# Patient Record
Sex: MALE | Race: WHITE
[De-identification: names, ages, dates, MRNs, and addresses within clinical notes are randomized per-mention and may not be internally consistent; named-entity substitution may affect disease eponyms.]

---

## 2017-06-20 NOTE — DIAGNOSTIC IMAGING REPORT
MRI OF THE LUMBAR SPINE WITHOUT IV CONTRAST



CLINICAL HISTORY: Chronic low back pain. Left lower extremity radiculopathy.



COMPARISON STUDY: Radiographs of the lumbar spine dated 11/30/2011. Abdominal CT

dated 5/27/2015.



TECHNIQUE: MRI of the lumbar spine is performed utilizing various T1 and

T2-weighted sequences in the axial and sagittal planes. IV contrast was not

administered for this examination.



FINDINGS:



Lumbar spine: Vertebral body height and alignment are maintained throughout the

lumbar spine. There is straightening of the lumbar lordosis. Marrow signal

intensity is heterogeneous. Chronic degenerative endplate change is identified

at L4-L5. Small anterior osteophytes are seen from L3 to S1. Small hemangiomas

are seen at several levels. No destructive bony process is seen. The transverse

and spinous processes appear intact. There is no evidence of spondylolysis.



Intervertebral discs: Degenerative disc desiccation is seen throughout the

lumbar spine. There is severe loss of height at L4-L5 and moderate loss of

height at L5-S1.



Spinal cord and central canal: The visualized spinal cord is normal in

morphology and signal intensity. The conus medullaris terminates at the T12-L1

interspace. The nerve roots of the cauda equina are normal in morphology. There

is peripheral splaying of the nerve roots of the cauda equina seen at the level

of L5. The central canal appears slightly widened at this level, and there is an

apparent septation within the central canal seen at the level of S1 where the

nerve roots reconverge centrally. This is noted on axial image #25. There is

near-complete fatty replacement of the central canal in the sacral region.



L1-L2: Unremarkable.



L2-L3: There is minimal posterior disc bulge. In conjunction with hypertrophy of

the ligamentum flavum there is mild acquired compromise of the central canal.

The minimum AP diameter measures 8 mm. There is minimal bilateral subarticular

stenosis. The neural foramina are patent. A synovial cyst on the right projects

medially at this level and measures 6 mm. This is best seen on axial image #8

and impinges on the posterior aspect of the thecal sac.



L3-L4: There is mild posterior disc bulge. In conjunction with hypertrophy of

the ligamentum flavum as well as a 10 mm T1 and T2 hypointense medially

projecting indeterminant nodule identified along the right aspect of the

ligamentum flavum adjacent to the facet joint seen on axial image #13 this

causes mild acquired compromise of the central canal. There is effacement of the

posterior right thecal sac from this nodular density. This impinges on the

transiting right-sided nerve roots. There is mild central canal stenosis at this

level with a minimum AP diameter of 7.5 mm.



L4-L5: There is broad-based posterior disc bulge eccentric to left. The central

canal appears clear. There is severe left-sided subarticular stenosis with

probable impingement on the exiting left L4 nerve root. Facet arthropathy causes

moderate left and minimal right neural foraminal stenosis.



L5-S1: There is mild posterior disc bulge. Prominent facet arthropathy causes

moderate bilateral neural foraminal stenosis. The central canal appears clear at

this level with the nerve roots displayed peripherally.



Sacrum: Visualized sacrum is normal in morphology and signal intensity.



Soft tissues: The paraspinous soft tissues are normal as imaged. The partially

visualized retroperitoneal structures are grossly normal but incompletely

assessed.





IMPRESSION:



1. No destructive bony process is identified.



2. There is a synovial cyst identified on the right at L2-L3. This effaces the

right posterior aspect of the thecal sac, and there is mild acquired compromise

of the central canal at this level.



3. There is an indeterminant nodule identified along the right ligamentum flavum

at L3-L4. This is located adjacent to the facet joint and may represent a

complex synovial cyst (T2 hypointense). Other lesions are not excluded. If

clinically warranted this could be further assessed with a contrast-enhanced

examination. This nodule effaces the right posterior aspect of the thecal sac at

this level and impinges on the transiting nerve roots. There is mild acquired

compromise of the central canal at this level.



4. There is near-complete fatty replacement of the central canal in the sacral

region. There is a septation suggested within the central canal in the AP plane

at S1. This is of indeterminant significance and may be on a congenital basis.



5. There is peripheral splaying of the nerve roots of the cauda equina at L5.

There is no compromise of the central canal at this level, and this is of

indeterminant significance. This may be related to a flow phenomenon, the

septation seen more inferiorly, or possibly represent a simple cystic lesion

within the central canal. The nerve roots of the cauda equina are normal in

morphology.



6. See discussion for detailed level by level analysis.









Dictated:  6/20/2017 10:24 AM

Transcribed:  6/20/2017 10:57 AM

[email or web address]







Electronically signed by:  Thor Granados M.D.

6/20/2017 2:38 PM



Dictated Date/Time:  6/20/2017 10:24 AM

## 2017-07-07 NOTE — PAP/PSG TECHNICIAN REPORT
[facility]

Technician Polysomnogram Report



Study name:

None

Report date:

7/7/2017



Study date:

7/6/2017

Referring Physician:

 José Miguel Quintanilla M.D.



Name:

NANDA BRIAN BEARD

Interpreting Physician:

José Miguel Quintanilla M.D.



YOB: 1950

Technician:

Mann Sanderson RPSGT.



Sex:

Male







Age:

67

StudyType:

PSG



Weight:

198 lbs





17 inches



Height:

67 years, Height 6' 0"

Neck Circum:







BMI:

26.85







Medications:

GLUCOPHAGE 500 MG, GLYBURIDE 2.5 MG, METOPROLOL SUCCINATE ER 25 MG, OPANA ER 10 MG, SIMVASTATIN 20 
MG, TAMSULOSIN HCL 0.4 MG















Patient History





PATIENT HAS HISTORY OF SNORING AND FATIGUE. HE ALSO HAS HISTORY MAINTAING SLEEP AND THIS WILL HAPPEN 
ALMOST EVERY NIGHT. HE IS ON CHRONIC NARCOTICS. HE HAD SLEEP STUDY DONE IN THE PAST BUT ONLY SHOWED 
MILD APNEA. HE IS HERE [facility] FOR AN EVALUATION FOR DIANA. ESS = 13 RM 1







Parameters Monitored

NPSG: E1-M2, E2-M1, Fp1-M2, Fp2-M1, F3-M2, F4-M2, F4-M1, C3-M2, C4-M2, C4-M1, O1-M2, O2-M2,

O2-M1, T3-M2, T4-M1, P3-M2, P4-M1, CHIN1, CHIN2, HR, EKG, Legs, PFLOW, SNOR, FLOW, CFLOW,

Tidal Volume, THOR, ABDO, SpO2, PLTH, CPRESS, ETCO2 Wave, ETCO2, pH





Sleep Architecture



Sleep Stages



Time at Lights Off

10:39:02 PM



STAGES

Time (min.)

TST (%)



Time at Lights On

5:28:32 AM



Wake

41.0

--



Total Recording Time (TRT)

410.00 min.



N1

5.5

1



Total Sleep Period (TSP)

405.5 min.



N2

228.5

62



Total Sleep Time (TST)

368.5min.



N3

52.0

14



Awake Time

41.0 min.



REM

82.5

22



Wake after Sleep Onset

40.0 min.











Sleep Efficiency (SE)

90 %











Sleep Onset Latency (SOL)

1.0 min.











Number of Stage 1 Shifts

None











Awakenings

10











Stage Changes

81











Number of REM periods

5



REM

82.5

22



REM Latency

10.5 min.



NREM

286.0

78





Body Position Analysis





Supine

Right

Left

Side

Prone

Vertical



Total Sleep Time (min.)

167.0

208.3

0.0

208.35

0.0

0.0



Total Sleep Time (%)

43%

57%

0%

57

0%

N/A%



Total Sleep Time REM (min.)

4.9

77.6

0.0

None

0.0

0.0



Total Sleep Time NREM (min.)

155.2

130.8

0.0

None

0.0

0.0



Intermittent Wake (min.)

6.8

34.2

0.0

None

0.0

0.0



Total Sleep Period (%)

40%

None





Arousals







Myoclonus (PLM) *







Events

Count

Index



Events

Count

Index



Spontaneous

18

3



Events Awake (PLMW)

50

73.2



Respiratory

17

2.8



Events Asleep w/ Arousal (PLMA)

2

0.3



PLM

1

0



Events Asleep w/o Arousal (PLMS)

25

4.1



Snoring

3

0



Total Asleep

27

4.4



Total

39

6



Total

77

11







Respiratory Analysis *





CA

OA

MA

CH

H

RERA

Total



Count

0

48

15

48



Index

0.0

0

7.8

2

10.3



Mean Duration

0.0

0.00

21.1

17.0

20.1



Longest Duration

0.0

0.00

0.0

21.5

42.2







Respiratory Event Summary 







Total

Supine

~Supine

Right

Left

Prone

REM

NREM



Apneas

Count

0

N/A

N/A

0

0





Index

0.0

0

0

0.0

N/A

N/A

0

0



Hypopneas (4% Desat)

Count

48

35

13

13

N/A

N/A

7

41





Index

7.8

13.1

4

3.7

N/A

N/A

5.1

8.6



Apneas & All Hypopneas 

Count

48

35

13

13

N/A

N/A

7

41





Index

7.8

13

4

4

N/A

N/A

5.1

8.6



Respiratory Events 

(Apn+All Hyp+RERA)

Count

48

45

18

18

N/A

N/A

7

41





Index

10.3

17

5

5.2

N/A

N/A

5.8

11.5



Respiratory Related Arousal

Count

17

45

5

5

N/A

N/A

1

16





Index

2.8

4

1

1

N/A

N/A

1

3





Snoring Analysis





Supine

Right

Left

Prone

REM

NREM

Total



Snore duration

9.1 min



Snores count

457

44

N/A

N/A

13

488

501



Snore mean duration

1.1 Sec



Snores index

171

13

N/A

N/A

9.5

102.4

81.6



TST with snoring (%)

2.5%







Desaturation Event Summary:



Minimum %SpO2

Event Count

Mean/Min/Max Duration(sec.)

Desaturation Index

% Time In Bed



> 90

54

31.6 / 11.5 / 67.3

8.3

99.0



86 - 90

0

N/A

0.0

1.0



81 - 85

0

N/A

0.0

0.0



76 - 80

0

N/A

0.0

0.0



71 - 75

0

N/A

0.0

0.0



66 - 70

0

N/A

0.0

0.0



61 - 65

0

N/A

0.0

0.0



56 - 60

0

N/A

0.0

0.0



51 - 55

0

N/A

0.0

0.0



< 50

0

N/A

0.0

0.0









Total

REM

NREM

Awake



<50%

0.0 min.



51 - 60%

0.0 min.



61 - 70%

0.0 min.



71 - 80%

0.0 min.



81 - 90%

3.8 min.

0.1 min.

3.6 min.

0.1 min.



91 - 100%

392.1 min.

82.4 min.

282.4 min.

27.3 min.



Average

95

96

94

95



Minimum SpO2

89

90



Desaturation Event Index

7.9

5.8

8.6

7.3



# Desat. Events below 89%

N/A



Time(%) with Saturation below 89%

0.0



Time(min.) with Saturation below 89%

0.0









Time (mins)

REM (mins)

NREM (mins)

% of TST



SpO2 Below 90%

7

1

N6

0.0



SpO2 Below 88%

0





Heart Rate Analysis









Min (bpm)

Max (bpm)

Average (bpm)



Awake

42

67

57



NREM

38

63

47



REM

39

58

47



Overall

38

63

47













Supplemental O2 Values



Minimum O2 level: None



Value  

Start Time

End Time





Technician Comments





Mr. Bergeron slept in the right and supine positions.  PAC's noted. Leg movements noted.  No bruxism 
noted.  Snoring was noted and scored as a 3 on a scale of 1 through 5. (0=no snoring, 5=snoring loud 
enough to be heard through a closed door or down the [address]) Mr. Bergeron awoke to use the restroom 
2 times during the night.  Mr. Bergeron stated I slept as well as I do when I am in my own bed. 



The final report will be interpreted and signed by a sleep physician. The completed physician report 
will then be placed in the patient medical record.







 



 



 



 



 



 



 



 

 



Therapy (cm H2O)

0



TIB (min.)

409.5



TST (min.)

368.5



Sleep Onset (min.)

1.0



REM Onset From Sleep (min.)

10.5



Sleep Efficiency %

90



Wakefulness (%)

10



Wakefulness (min.)

41.0



NREM 1 (%)

1



NREM 1 (min.)

5.5



NREM 2 (%)

62



NREM 2 (min.)

228.5



NREM 3 (%)

14



NREM 3 (min.)

52.0







REM (%)

22



REM (min.)

82.5



# Arousals

39



Arousal Index

6



# Snore

501



Snore Index

81.6



AHI

7.8



AHI Supine

13



AHI Non-Supine

4



NREM AHI

8.6



REM AHI

5.1



RDI

10.3



# Obstructive Apnea

0



# Central Apnea

0



# Mixed Apnea

0







# Hypopneas

48



RERAs

15



Total Respiratory Events

63



Time Below SpO2 89% (min.)

0.0



Mean NREM SpO2 (%)

94



Mean REM SpO2 (%)

96



Mean Sleep SpO2 (%)

95



Min NREM SpO2 (%)

89



Min REM SpO2 (%)

89



Position Supine (min.)

167.0



Position Non-supine (min.)

208.3



LM Index Sleep

4.4



LM Index NREM

3.8



LM Index REM

6.5







Mean Heart Rate (bpm)

47



Min Heart Rate (bpm)

38

## 2017-07-11 NOTE — SLEEP STUDY
Sleep Study Report


Date of Service:


July 6, 2017


Sleep Study Report


Clinical data:  The patient is a 67-year-old male with a BMI of 26.9 referred 

for evaluation of possible sleep apnea. He has a history of insomnia. He has 

severe fatigue and fragmented sleep architecture.  He does have snoring and 

witnessed apnea at night.  He had a previous sleep study which showed no sleep 

apnea.  He is on chronic narcotics for chronic back pain.  He has had severe 

side effects related Ambien CR including sleep related eating disorder and 

sleep walking.





Sleep architecture:  Total sleep period  was 405.5 minutes.  Total sleep time 

is 368.5 minutes, divided between 286 minutes of non-REM sleep and 82.5 minutes 

of REM sleep.  Sleep latency was 1 minute.  REM latency was 10.5 minutes.  

Sleep efficiency was 90 percent.  Wake after sleep onset was 40 minutes.  Sleep 

consisted of stage N1 1 percent, stage N2 62 percent, stage N3 14 percent, and 

REM 22 percent.





Arousal data:  39 arousals were recorded for an index of 6 per hour.





PLM data:  27 limb movements were noted for an index of 4.4 per hour with an 

arousal index of 0.3 per are





Respiratory data:  Mild sleep apnea was documented.  The AHI was  7.8.  There 

were 48 hypopneas , the mean duration of which was 21.1 seconds.





Oximetry data:  No significant hypoxemia was seen.  Oxygen he was 89 

percent.  Mean saturation was 95 percent.





EKG:  Heart rate ranged from 38 to 63 beats per minute.  PACs were noted.





Technician's comments:  Patient slept in the right and supine positions.  

Snoring was moderate rated 3 on a scale of 1 through 5.  He woke twice to use 

the restroom.





Impression:  Mild sleep apnea with an AHI of 7.8 without nocturnal hypoxemia 

excessive limb movements during sleep.  The patient did have very for shortened 

sleep latency and for shortened REM latency.





Recommendations:  The patient may benefit from a repeat sleep study with CPAP.  

Reduction of his narcotic dosage would also be of benefit.


Copies To 1:   Easton Harris M.D.

## 2017-10-04 NOTE — DIAGNOSTIC IMAGING REPORT
ABDOMINAL ULTRASOUND, RIGHT UPPER QUADRANT



HISTORY:      UNSPECIFIED CIRRHOSIS OF LIVER.



COMPARISON:  Abdominal ultrasound 8/12/2016.



FINDINGS:



Pancreas: The pancreatic head and tail are obscured by overlying bowel gas. The

remaining portions of the pancreas are within normal limits.



Liver: Slightly nodular contour to the liver consistent with cirrhosis. Stable 7

mm cyst within the liver adjacent to the gallbladder fossa.



Gallbladder: No gallbladder wall thickening. Small amount of gallbladder sludge

is present.



CBD: 6 mm.



Right kidney: No hydronephrosis.



IMPRESSION: 



1. Cirrhotic liver is again noted.

2. Small amount of gallbladder sludge. No gallbladder wall thickening.







Electronically signed by:  Lei Hdez M.D.

10/4/2017 9:45 AM



Dictated Date/Time:  10/4/2017 9:42 AM
Statement Selected

## 2018-02-06 ENCOUNTER — HOSPITAL ENCOUNTER (INPATIENT)
Dept: HOSPITAL 45 - C.EDB | Age: 68
LOS: 2 days | Discharge: HOME | DRG: 872 | End: 2018-02-08
Attending: FAMILY MEDICINE | Admitting: FAMILY MEDICINE
Payer: COMMERCIAL

## 2018-02-06 VITALS
DIASTOLIC BLOOD PRESSURE: 72 MMHG | TEMPERATURE: 97.88 F | SYSTOLIC BLOOD PRESSURE: 144 MMHG | HEART RATE: 52 BPM | OXYGEN SATURATION: 100 %

## 2018-02-06 VITALS — OXYGEN SATURATION: 98 %

## 2018-02-06 VITALS
HEIGHT: 71 IN | BODY MASS INDEX: 25.43 KG/M2 | BODY MASS INDEX: 25.43 KG/M2 | WEIGHT: 181.66 LBS | HEIGHT: 71 IN | WEIGHT: 181.66 LBS

## 2018-02-06 VITALS
SYSTOLIC BLOOD PRESSURE: 142 MMHG | TEMPERATURE: 98.6 F | DIASTOLIC BLOOD PRESSURE: 71 MMHG | OXYGEN SATURATION: 97 % | HEART RATE: 55 BPM

## 2018-02-06 VITALS — OXYGEN SATURATION: 99 %

## 2018-02-06 DIAGNOSIS — Z85.810: ICD-10-CM

## 2018-02-06 DIAGNOSIS — M54.5: ICD-10-CM

## 2018-02-06 DIAGNOSIS — I10: ICD-10-CM

## 2018-02-06 DIAGNOSIS — N17.9: ICD-10-CM

## 2018-02-06 DIAGNOSIS — E87.0: ICD-10-CM

## 2018-02-06 DIAGNOSIS — G89.29: ICD-10-CM

## 2018-02-06 DIAGNOSIS — E87.5: ICD-10-CM

## 2018-02-06 DIAGNOSIS — E83.42: ICD-10-CM

## 2018-02-06 DIAGNOSIS — I95.9: ICD-10-CM

## 2018-02-06 DIAGNOSIS — K74.60: ICD-10-CM

## 2018-02-06 DIAGNOSIS — R63.4: ICD-10-CM

## 2018-02-06 DIAGNOSIS — F41.9: ICD-10-CM

## 2018-02-06 DIAGNOSIS — A41.9: Primary | ICD-10-CM

## 2018-02-06 DIAGNOSIS — E78.5: ICD-10-CM

## 2018-02-06 DIAGNOSIS — Z79.899: ICD-10-CM

## 2018-02-06 DIAGNOSIS — F11.21: ICD-10-CM

## 2018-02-06 DIAGNOSIS — Z79.84: ICD-10-CM

## 2018-02-06 DIAGNOSIS — Z95.1: ICD-10-CM

## 2018-02-06 DIAGNOSIS — R06.02: ICD-10-CM

## 2018-02-06 DIAGNOSIS — R07.9: ICD-10-CM

## 2018-02-06 DIAGNOSIS — K75.81: ICD-10-CM

## 2018-02-06 DIAGNOSIS — F32.9: ICD-10-CM

## 2018-02-06 DIAGNOSIS — K76.9: ICD-10-CM

## 2018-02-06 DIAGNOSIS — Z79.82: ICD-10-CM

## 2018-02-06 DIAGNOSIS — E11.65: ICD-10-CM

## 2018-02-06 LAB
ALBUMIN SERPL-MCNC: 4.1 GM/DL (ref 3.4–5)
ALP SERPL-CCNC: 52 U/L (ref 45–117)
ALT SERPL-CCNC: 41 U/L (ref 12–78)
AST SERPL-CCNC: 33 U/L (ref 15–37)
BASOPHILS # BLD: 0.01 K/UL (ref 0–0.2)
BASOPHILS NFR BLD: 0.1 %
BUN SERPL-MCNC: 42 MG/DL (ref 7–18)
CALCIUM SERPL-MCNC: 10.8 MG/DL (ref 8.5–10.1)
CK MB SERPL-MCNC: 6.8 NG/ML (ref 0.5–3.6)
CO2 SERPL-SCNC: 24 MMOL/L (ref 21–32)
CREAT SERPL-MCNC: 1.95 MG/DL (ref 0.6–1.4)
EOS ABS #: 0.08 K/UL (ref 0–0.5)
EOSINOPHIL NFR BLD AUTO: 152 K/UL (ref 130–400)
GLUCOSE SERPL-MCNC: 327 MG/DL (ref 70–99)
HCT VFR BLD CALC: 42.8 % (ref 42–52)
HGB BLD-MCNC: 15.3 G/DL (ref 14–18)
IG#: 0.03 K/UL (ref 0–0.02)
IMM GRANULOCYTES NFR BLD AUTO: 16.4 %
INFLUENZA B ANTIGEN: (no result)
INR PPP: 1.1 (ref 0.9–1.1)
LIPASE: 235 U/L (ref 73–393)
LYMPHOCYTES # BLD: 1.63 K/UL (ref 1.2–3.4)
MCH RBC QN AUTO: 31.5 PG (ref 25–34)
MCHC RBC AUTO-ENTMCNC: 35.7 G/DL (ref 32–36)
MCV RBC AUTO: 88.1 FL (ref 80–100)
MONO ABS #: 0.6 K/UL (ref 0.11–0.59)
MONOCYTES NFR BLD: 6 %
NEUT ABS #: 7.58 K/UL (ref 1.4–6.5)
NEUTROPHILS # BLD AUTO: 0.8 %
NEUTROPHILS NFR BLD AUTO: 76.4 %
PHOSPHATE SERPL-MCNC: 4 MG/DL (ref 2.5–4.9)
PMV BLD AUTO: 12.1 FL (ref 7.4–10.4)
POTASSIUM SERPL-SCNC: 5.2 MMOL/L (ref 3.5–5.1)
PROT SERPL-MCNC: 7.7 GM/DL (ref 6.4–8.2)
PTT PATIENT: 18.5 SECONDS (ref 21–31)
RED CELL DISTRIBUTION WIDTH CV: 12.9 % (ref 11.5–14.5)
RED CELL DISTRIBUTION WIDTH SD: 41 FL (ref 36.4–46.3)
RETIC COUNT %: 1.2 % (ref 0.5–2)
SODIUM SERPL-SCNC: 133 MMOL/L (ref 136–145)
WBC # BLD AUTO: 9.93 K/UL (ref 4.8–10.8)

## 2018-02-06 RX ADMIN — SODIUM CHLORIDE SCH MLS/HR: 900 INJECTION, SOLUTION INTRAVENOUS at 20:52

## 2018-02-06 NOTE — EMERGENCY ROOM VISIT NOTE
History


Report prepared by Song:  Penelope Barrera


Under the Supervision of:  Dr. Mikal Sharp D.O.


First contact with patient:  12:30


Chief Complaint:  DIZZY


Stated Complaint:  DIZZY,NO ENERGY,SOB,NO APETITE,WEIGHTLOSS,BSG HIGH


Nursing Triage Summary:  


Pt states, "I feel lousy. I am lightheaded and dizzy. I have heaviness in my 


chest and feel sob. I had a triple bypass. The past couple days I come home 


after a couple hrs of work because I can't function. My blood sugar has been 


crazy. I take medicine, not insulin. I haven't been eating well for quite 


awhile. I think I have lost 12-15 lbs in two weeks. Everything I say is echoing 


through my head. I just got a h/a. My voice has gotten soft and weak. I don't 


have a sore throat." 





Hx of tongue cancer.





History of Present Illness


The patient is a 67 year old male who presents to the Emergency Room with 

complaints of worsening generalized illness beginning two weeks ago. The 

patient reports fatigue, dizzy, decreased appetite, leg cramping, generalized 

weakness, lightheaded, chest heaviness, shortness of breath with exertion, and 

weight loss. The patient lost about 15 pound over the past two weeks. Per wife, 

the patient's voice sounds different and more "raspy". The patient reports he 

was on opioids for an extended period which just stopped taking in December. 

The patient states his liver is being monitored for "slight scarring". He 

states he had an appointment with his PCP tomorrow but was referred to come to 

the ED when he told them his symptoms. The patient denies any leg swelling, 

bloody or black stools, or abdominal pain. The patient has a history of a heart 

attack, triple bypass, diabetes, and oral cancer in 1990. The patient denies 

any tobacco or alcohol use. The patient recently had an MRI on his back and was 

put on fentanyl patches for his pain.





   Source of History:  patient


   Onset:  two weeks ago


   Position:  other (generalized)


   Quality:  other (illness)


   Timing:  worsening


   Associated Symptoms:  + chest pain, + SOB, + fatigue, + weakness, No 

abdominal pain





Review of Systems


See HPI for pertinent positives & negatives. A total of 10 systems reviewed and 

were otherwise negative.





Past Medical & Surgical


Medical Problems:


(1) Diabetes


(2) Flu-like symptoms


(3) Generalized weakness


(4) Heart attack


(5) Oral cancer


Surgical Problems:


(1) S/P triple vessel bypass








Family History





Patient reports no known family medical history.





Social History


Smoking Status:  Never Smoker


Alcohol Use:  none


Marital Status:  


Housing Status:  lives with family


Occupation Status:  employed





Current/Historical Medications


Scheduled


Aspirin (Ecotrin Regular Strength), 325 MG PO Q2D


Glyburide (Diabeta), 2.5 MG PO BID


Lisinopril (Zestril), 10 MG PO DAILY


Metformin Hcl (Glucophage), 500 MG PO BID


Metoprolol Succ (Toprol Xl) (Toprol-Xl), 50 MG PO BID


Simvastatin (Zocor), 40 MG PO DAILY


Tamsulosin Hcl (Flomax), 0.4 MG PO DAILY





Allergies


Coded Allergies:  


     No Known Allergies (Verified , 2/6/18)





Physical Exam


Vital Signs











  Date Time  Temp Pulse Resp B/P (MAP) Pulse Ox O2 Delivery O2 Flow Rate FiO2


 


2/6/18 17:29  50 20 127/69 99 Room Air  


 


2/6/18 17:17     98 Room Air  


 


2/6/18 15:39  51 18 116/59 98 Room Air  


 


2/6/18 13:58  51 20 93/59 97 Room Air  


 


2/6/18 13:03  51      


 


2/6/18 12:50     99 Room Air  


 


2/6/18 12:10 36.5 58 18 94/59 99 Room Air  











Physical Exam


GENERAL:  Patient is awake, alert, and in no acute distress. Patient is resting 

comfortably and showing no signs of anxiety


EYES:  The conjunctiva are pale pupils are round and reactive. 


EARS, NOSE, MOUTH AND THROAT: The nose is without any evidence of any 

deformity. Mucous membranes are moist tongue is midline 


NECK: The neck is nontender and supple.


RESPIRATORY: Normal respiratory effort is noted there is no evidence of 

wheezing rhonchi or rales


CARDIOVASCULAR:  Regular rate and rhythm noted there no murmurs rubs or gallops 

normal S1 normal S2 


GASTROINTESTINAL: The abdomen is soft. Bowel sounds are present in all 

quadrants. Abdomen is nontender


MUSCULOSKELETAL/EXTREMITIES: There is no evidence of gross deformity full range 

of motion is noted in the hips and shoulders


SKIN: There is no obvious evidence of any rash. There are no petechiae, pallor 

or cyanosis noted. 


NEUROLOGIC:  Patient is awake alert and oriented x3 strength is symmetric 

patellar reflexes are 2+ bilaterally





Medical Decision & Procedures


ER Provider


Diagnostic Interpretation:


Radiology results as stated below per my review and radiologist interpretation:





CHEST ONE VIEW PORTABLE





FINDINGS:


Median sternotomy wires and mediastinal surgical clips unchanged.


Atherosclerosis of aortic arch. Chronic silhouette normal in size. Lungs and


pleural spaces clear. Osseous structures normal. Upper abdomen normal.





IMPRESSION:


1.  No acute cardiopulmonary disease.





Electronically signed by:  Senthil Cabral M.D.








HEAD WITHOUT CONTRAST (CT)





Findings: The paranasal sinuses and mastoid air cells are clear. The calvarium


and skull base are intact. The ventricles and sulci are within normal limits.


There is no mass, hematoma, midline shift, or acute infarct.





Impression:


No acute intracranial abnormality. 


The above report was generated using voice recognition software.  It may contain


grammatical, syntax or spelling errors.


Electronically signed by:  Delbert Ruffin M.D.





Laboratory Results


2/6/18 12:40








Red Blood Count 4.86, Mean Corpuscular Volume 88.1, Mean Corpuscular Hemoglobin 

31.5, Mean Corpuscular Hemoglobin Concent 35.7, Mean Platelet Volume 12.1, 

Neutrophils (%) (Auto) 76.4, Lymphocytes (%) (Auto) 16.4, Monocytes (%) (Auto) 

6.0, Eosinophils (%) (Auto) 0.8, Basophils (%) (Auto) 0.1, Neutrophils # (Auto) 

7.58, Lymphocytes # (Auto) 1.63, Monocytes # (Auto) 0.60, Eosinophils # (Auto) 

0.08, Basophils # (Auto) 0.01





2/6/18 12:40

















Test


  2/6/18


12:40 2/6/18


13:01 2/6/18


17:23 2/6/18


18:53


 


White Blood Count


  9.93 K/uL


(4.8-10.8) 


  


  


 


 


Red Blood Count


  4.86 M/uL


(4.7-6.1) 


  


  


 


 


Hemoglobin


  15.3 g/dL


(14.0-18.0) 


  


  


 


 


Hematocrit 42.8 % (42-52)    


 


Mean Corpuscular Volume


  88.1 fL


() 


  


  


 


 


Mean Corpuscular Hemoglobin


  31.5 pg


(25-34) 


  


  


 


 


Mean Corpuscular Hemoglobin


Concent 35.7 g/dl


(32-36) 


  


  


 


 


Platelet Count


  152 K/uL


(130-400) 


  


  


 


 


Mean Platelet Volume


  12.1 fL


(7.4-10.4) 


  


  


 


 


Neutrophils (%) (Auto) 76.4 %    


 


Lymphocytes (%) (Auto) 16.4 %    


 


Monocytes (%) (Auto) 6.0 %    


 


Eosinophils (%) (Auto) 0.8 %    


 


Basophils (%) (Auto) 0.1 %    


 


Neutrophils # (Auto)


  7.58 K/uL


(1.4-6.5) 


  


  


 


 


Lymphocytes # (Auto)


  1.63 K/uL


(1.2-3.4) 


  


  


 


 


Monocytes # (Auto)


  0.60 K/uL


(0.11-0.59) 


  


  


 


 


Eosinophils # (Auto)


  0.08 K/uL


(0-0.5) 


  


  


 


 


Basophils # (Auto)


  0.01 K/uL


(0-0.2) 


  


  


 


 


RDW Standard Deviation


  41.0 fL


(36.4-46.3) 


  


  


 


 


RDW Coefficient of Variation


  12.9 %


(11.5-14.5) 


  


  


 


 


Immature Granulocyte % (Auto) 0.3 %    


 


Immature Granulocyte # (Auto)


  0.03 K/uL


(0.00-0.02) 


  


  


 


 


Erythrocyte Sedimentation Rate


  22 mm/hr


(0-14) 


  


  


 


 


Absolute Reticulocyte Count


  0.06 10^6/uL


(0.02-0.10) 


  


  


 


 


Percent Reticulocyte Count


  1.2 %


(0.5-2.0) 


  


  


 


 


Immature Reticulocyte Fraction


  3.6 %


(2.3-13.4) 


  


  


 


 


Reticulocyte Hemoglobin


Content 33.8 PG


(28.2-36.6) 


  


  


 


 


Prothrombin Time


  11.1 SECONDS


(9.0-12.0) 


  


  


 


 


Prothromb Time International


Ratio 1.1 (0.9-1.1) 


  


  


  


 


 


Activated Partial


Thromboplast Time 18.5 SECONDS


(21.0-31.0) 


  


  


 


 


Partial Thromboplastin Ratio 0.7    


 


Anion Gap


  9.0 mmol/L


(3-11) 


  


  


 


 


Est Creatinine Clear Calc


Drug Dose 40.2 ml/min 


  


  


  


 


 


Estimated GFR (


American) 40.6 


  


  


  


 


 


Estimated GFR (Non-


American 35.1 


  


  


  


 


 


BUN/Creatinine Ratio 21.7 (10-20)    


 


Calcium Level


  10.8 mg/dl


(8.5-10.1) 


  


  


 


 


Phosphorus Level


  4.0 mg/dl


(2.5-4.9) 


  


  


 


 


Magnesium Level


  2.0 mg/dl


(1.8-2.4) 


  


  


 


 


Total Bilirubin


  1.2 mg/dl


(0.2-1) 


  


  


 


 


Aspartate Amino Transf


(AST/SGOT) 33 U/L (15-37) 


  


  


  


 


 


Alanine Aminotransferase


(ALT/SGPT) 41 U/L (12-78) 


  


  


  


 


 


Alkaline Phosphatase


  52 U/L


() 


  


  


 


 


Total Creatine Kinase


  114 U/L


() 


  


  


 


 


Creatine Kinase MB


  6.8 ng/ml


(0.5-3.6) 


  


  


 


 


Creatine Kinase MB Ratio 6.0 (0-3.0)    


 


Troponin I


  < 0.015 ng/ml


(0-0.045) 


  


  


 


 


C-Reactive Protein


  < 0.29 mg/dl


(0-0.29) 


  


  


 


 


Total Protein


  7.7 gm/dl


(6.4-8.2) 


  


  


 


 


Albumin


  4.1 gm/dl


(3.4-5.0) 


  


  


 


 


Globulin


  3.6 gm/dl


(2.5-4.0) 


  


  


 


 


Albumin/Globulin Ratio 1.1 (0.9-2)    


 


Lipase


  235 U/L


() 


  


  


 


 


Beta-Hydroxybutyric Acid


  1.84 mg/dL


(0.2-2.81) 


  


  


 


 


Thyroid Stimulating Hormone


(TSH) 1.380 uIu/ml


(0.300-4.500) 


  


  


 


 


Free Thyroxine


  1.10 ng/dl


(0.80-1.60) 


  


  


 


 


Bedside Lactic Acid Venous


  


  2.88 mmol/L


(0.90-1.70) 


  


 


 


Lactic Acid Level


  


  


  2.7 mmol/L


(0.4-2.0) 


 





Laboratory results per my review.





Medications Administered











 Medications


  (Trade)  Dose


 Ordered  Sig/Ney


 Route  Start Time


 Stop Time Status Last Admin


Dose Admin


 


 Sodium Chloride  1,000 ml @ 


 999 mls/hr  Q1H1M ONCE


 IV  2/6/18 12:45


 2/6/18 13:45 DC 2/6/18 12:45


999 MLS/HR


 


 Sodium Chloride  1,000 ml @ 


 999 mls/hr  Q1H1M STAT


 IV  2/6/18 15:23


 2/6/18 16:23 DC 2/6/18 15:23


999 MLS/HR











ECG


Indication:  weakness


Rate (beats per minute):  50


Rhythm:  sinus bradycardia


Findings:  no ectopy, other (no acute ST segment abnormalities)


Comparison ECG Date:  4/18/09


Change:  no significant change


Change:


EKG interpreted by me.





ED Course


1239: The patient was evaluated in room B12B. A complete history and physical 

examination were performed. 





1245: Ordered NSS 1,000 ml @ 999 mls/hr IV. 





1523: Ordered NSS 1,000 ml @ 999 mls/hr IV





1527: I discussed the patient's case with Dr. Keita. The patient will be 

evaluated for further management.





Medical Decision


Differential diagnosis:


Etiologies such as metabolic, infection, hypo/hyperglycemia, electrolyte 

abnormalities, cardiac sources, intracerebral event, toxicologic, neurologic, 

as well as others were entertained. 





Nursing notes reviewed.





The patient is a 67-year-old male who presented to emergency department with 

very vague complaints including generalized weakness. The patient was found 

have hypotension but was also bradycardic. I'm unsure if this represents a 

complication from his current blood pressure medication. The patient had a 

septic workup was in the emergency department. He was treated with IV fluids. 

He was reevaluated multiple times and appeared to be feeling much better. His 

lactic acid was elevated as well as his potassium. I discussed the patient's 

laboratory and radiographic studies with him. Because of his symptoms I 

discussed his case with the on-call [facility] hospitalist group. They've 

agreed to evaluate the patient in emergency department for further management 

and disposition.





Medication Reconcilliation


Current Medication List:  was personally reviewed by me





Blood Pressure Screening


Patient's blood pressure:  Low blood pressure





Consults


Time Called:  1525


Consulting Physician:  Dr. Keita


Returned Call:  1527


I discussed the patient's case with Dr. Keita. The patient will be evaluated 

for further management.





Impression





 Primary Impression:  


 Weakness


 Additional Impressions:  


 Chest pain


 Dyspnea


 Dehydration


 Hypercalcemia





Scribe Attestation


The scribe's documentation has been prepared under my direction and personally 

reviewed by me in its entirety. I confirm that the note above accurately 

reflects all work, treatment, procedures, and medical decision making performed 

by me.





Departure Information


Dispostion


Being Evaluated By Hospitalist





Referrals


Easton Harris M.D. (PCP)





Patient Instructions


My [facility]





Problem Qualifiers








 Additional Impressions:  


 Chest pain


 Chest pain type:  unspecified  Qualified Codes:  R07.9 - Chest pain, 

unspecified


 Dyspnea


 Dyspnea type:  dyspnea on exertion  Qualified Codes:  R06.09 - Other forms of 

dyspnea

## 2018-02-06 NOTE — DIAGNOSTIC IMAGING REPORT
HEAD WITHOUT CONTRAST (CT)



CT DOSE: 537.48 mGy.cm



HISTORY: Mental status change  weakness



TECHNIQUE: Multiaxial CT images of the head were performed without the use of

intravenous contrast.  A dose lowering technique was utilized adhering to the

principles of ALARA.





Comparison: None.



Findings: The paranasal sinuses and mastoid air cells are clear. The calvarium

and skull base are intact. The ventricles and sulci are within normal limits.

There is no mass, hematoma, midline shift, or acute infarct.



Impression:

No acute intracranial abnormality. 











The above report was generated using voice recognition software.  It may contain

grammatical, syntax or spelling errors.







Electronically signed by:  Delbert Ruffin M.D.

2/6/2018 2:30 PM



Dictated Date/Time:  2/6/2018 2:28 PM

## 2018-02-06 NOTE — HISTORY AND PHYSICAL
History & Physical


Date & Time of Service:


Feb 6, 2018 at 18:36


Chief Complaint:


Dizzy,No Energy,Sob,No Apetite,Weightloss,Bsg High


Primary Care Physician:


Easton Harris M.D.


History of Present Illness


Source:  patient, family


68 yo male with PMH of CABG x3 (2000), HTN, HLD. DMII, presents to [facility] complaining of flu-like symptoms over the past two 

weeks. Patient reports that the predominant symptoms have been weakness and 

fatigue. He says that the weakness is most noticeable when he is walking up 

steps and becomes SOB. In addition to SOB with exertion, the patient also 

reports intermittent substernal pressure and chest tightness. 





Patient reports that he recently stopped using opioid medications cold turkey 

at the beginning of Dec after 10 years of use for chronic back pain. The 

patient reported withdrawal symptoms in the immediate aftermath and was 

subsequently prescribed and completed tapering doses. He says that since 

quitting the pain medications, he has had both labile blood sugars and labile 

blood pressures, both of which have been previously well controlled. 





Additional symptoms over this two week period include; dizziness, near syncope, 

loss of appetite, 15 lbs weight loss, occipital headaches and lower extremity 

cramping.





Past Medical/Surgical History


Medical Problems:


(1) Diabetes


Status: Chronic  





(2) Heart attack


Status: Resolved  





(3) Oral cancer


Status: Resolved  





Surgical Problems:


(1) S/P triple vessel bypass


Status: Resolved  











Family History





Patient reports no known family medical history.





Social History


Smoking Status:  Never Smoker


Marital Status:  


Housing status:  lives with family


Occupational Status:  employed





Immunizations


History of Tetanus Vaccine?:  Unknown


History of Pneumococcal:  No


History of Hepatitis B Vaccine:  No





Multi-Drug Resistant Organisms


History of MDRO:  No





Allergies


Coded Allergies:  


     No Known Allergies (Verified , 2/6/18)





Home Medications


Scheduled


Aspirin (Ecotrin Regular Strength), 325 MG PO Q2D


Glyburide (Diabeta), 2.5 MG PO BID


Lisinopril (Zestril), 10 MG PO DAILY


Metformin Hcl (Glucophage), 500 MG PO BID


Metoprolol Succ (Toprol Xl) (Toprol-Xl), 50 MG PO BID


Simvastatin (Zocor), 40 MG PO DAILY


Tamsulosin Hcl (Flomax), 0.4 MG PO DAILY





Review of Systems


Constitutional:  + fatigue, No fever, No chills, No sweats


ENT:  + nasal symptoms, + sore throat, + problem reported (hoarseness), No 

trouble swallowing


Respiratory:  + shortness of breath, + dyspnea on exertion, No cough, No sputum

, No wheezing


Cardiovascular:  No chest pain, No edema, No palpitations


Abdomen:  No pain, No nausea, No vomiting, No diarrhea


Genitourinary - Male:  No dysuria


Neurologic:  + weakness


Endocrine:  + fatigue





Physical Exam


Vital Signs











  Date Time  Temp Pulse Resp B/P (MAP) Pulse Ox O2 Delivery O2 Flow Rate FiO2


 


2/6/18 17:29  50 20 127/69 99 Room Air  


 


2/6/18 17:17     98 Room Air  


 


2/6/18 15:39  51 18 116/59 98 Room Air  


 


2/6/18 13:58  51 20 93/59 97 Room Air  


 


2/6/18 13:03  51      


 


2/6/18 12:50     99 Room Air  


 


2/6/18 12:10 36.5 58 18 94/59 99 Room Air  








General Appearance:  no apparent distress


Head:  normocephalic, atraumatic


Neck:  supple, no adenopathy, thyroid normal


Respiratory/Chest:  chest non-tender, lungs clear, normal breath sounds, no 

respiratory distress


Cardiovascular:  regular rate, rhythm, no JVD, normal peripheral pulses, + 

diastolic murmur


Abdomen/GI:  normal bowel sounds, non tender, soft, no organomegaly


Neurologic/Psych:  CNs II-XII nml as tested, no motor/sensory deficits, alert, 

normal mood/affect, normal reflexes, oriented x 3


Skin:  warm/dry, + pallor, + pertinent finding (gray, dusky skin )





Diagnostics


Laboratory Results





Results Past 24 Hours








Test


  2/6/18


12:40 2/6/18


13:01 2/6/18


17:23 Range/Units


 


 


White Blood Count 9.93   4.8-10.8  K/uL


 


Red Blood Count 4.86   4.7-6.1  M/uL


 


Hemoglobin 15.3   14.0-18.0  g/dL


 


Hematocrit 42.8   42-52  %


 


Mean Corpuscular Volume 88.1     fL


 


Mean Corpuscular Hemoglobin 31.5   25-34  pg


 


Mean Corpuscular Hemoglobin


Concent 35.7


  


  


  32-36  g/dl


 


 


Platelet Count 152   130-400  K/uL


 


Mean Platelet Volume 12.1   7.4-10.4  fL


 


Neutrophils (%) (Auto) 76.4    %


 


Lymphocytes (%) (Auto) 16.4    %


 


Monocytes (%) (Auto) 6.0    %


 


Eosinophils (%) (Auto) 0.8    %


 


Basophils (%) (Auto) 0.1    %


 


Neutrophils # (Auto) 7.58   1.4-6.5  K/uL


 


Lymphocytes # (Auto) 1.63   1.2-3.4  K/uL


 


Monocytes # (Auto) 0.60   0.11-0.59  K/uL


 


Eosinophils # (Auto) 0.08   0-0.5  K/uL


 


Basophils # (Auto) 0.01   0-0.2  K/uL


 


RDW Standard Deviation 41.0   36.4-46.3  fL


 


RDW Coefficient of Variation 12.9   11.5-14.5  %


 


Immature Granulocyte % (Auto) 0.3    %


 


Immature Granulocyte # (Auto) 0.03   0.00-0.02  K/uL


 


Erythrocyte Sedimentation Rate 22   0-14  mm/hr


 


Absolute Reticulocyte Count


  0.06


  


  


  0.02-0.10


10^6/uL


 


Percent Reticulocyte Count 1.2   0.5-2.0  %


 


Immature Reticulocyte Fraction 3.6   2.3-13.4  %


 


Reticulocyte Hemoglobin


Content 33.8


  


  


  28.2-36.6  PG


 


 


Prothrombin Time


  11.1


  


  


  9.0-12.0


SECONDS


 


Prothromb Time International


Ratio 1.1


  


  


  0.9-1.1  


 


 


Activated Partial


Thromboplast Time 18.5


  


  


  21.0-31.0


SECONDS


 


Partial Thromboplastin Ratio 0.7    


 


Sodium Level 133   136-145  mmol/L


 


Potassium Level 5.2   3.5-5.1  mmol/L


 


Chloride Level 100     mmol/L


 


Carbon Dioxide Level 24   21-32  mmol/L


 


Anion Gap 9.0   3-11  mmol/L


 


Blood Urea Nitrogen 42   7-18  mg/dl


 


Creatinine


  1.95


  


  


  0.60-1.40


mg/dl


 


Est Creatinine Clear Calc


Drug Dose 40.2


  


  


   ml/min


 


 


Estimated GFR (


American) 40.6


  


  


   


 


 


Estimated GFR (Non-


American 35.1


  


  


   


 


 


BUN/Creatinine Ratio 21.7   10-20  


 


Random Glucose 327   70-99  mg/dl


 


Calcium Level 10.8   8.5-10.1  mg/dl


 


Phosphorus Level 4.0   2.5-4.9  mg/dl


 


Magnesium Level 2.0   1.8-2.4  mg/dl


 


Total Bilirubin 1.2   0.2-1  mg/dl


 


Aspartate Amino Transf


(AST/SGOT) 33


  


  


  15-37  U/L


 


 


Alanine Aminotransferase


(ALT/SGPT) 41


  


  


  12-78  U/L


 


 


Alkaline Phosphatase 52     U/L


 


Total Creatine Kinase 114     U/L


 


Creatine Kinase MB 6.8   0.5-3.6  ng/ml


 


Creatine Kinase MB Ratio 6.0   0-3.0  


 


Troponin I < 0.015   0-0.045  ng/ml


 


C-Reactive Protein < 0.29   0-0.29  mg/dl


 


Total Protein 7.7   6.4-8.2  gm/dl


 


Albumin 4.1   3.4-5.0  gm/dl


 


Globulin 3.6   2.5-4.0  gm/dl


 


Albumin/Globulin Ratio 1.1   0.9-2  


 


Lipase 235     U/L


 


Beta-Hydroxybutyric Acid 1.84   0.2-2.81  mg/dL


 


Thyroid Stimulating Hormone


(TSH) 1.380


  


  


  0.300-4.500


uIu/ml


 


Free Thyroxine


  1.10


  


  


  0.80-1.60


ng/dl


 


Bedside Lactic Acid Venous


  


  2.88


  


  0.90-1.70


mmol/L


 


Lactic Acid Level   2.7 0.4-2.0  mmol/L








Microbiology Results


2/6/18 Blood Culture, Received


         Pending


2/6/18 Blood Culture, Received


         Pending





Diagnostic Radiology


CHEST ONE VIEW PORTABLE





CLINICAL HISTORY: 67 years-old Male presenting with Sepsis. 





TECHNIQUE: Portable upright AP view of the chest was obtained.





COMPARISON: 9/11/2013.





FINDINGS:


Median sternotomy wires and mediastinal surgical clips unchanged.


Atherosclerosis of aortic arch. Chronic silhouette normal in size. Lungs and


pleural spaces clear. Osseous structures normal. Upper abdomen normal.





IMPRESSION:


1.  No acute cardiopulmonary disease.








[~ rep ct add3]]


HEAD WITHOUT CONTRAST (CT)





CT DOSE: 537.48 mGy.cm





HISTORY: Mental status change  weakness





TECHNIQUE: Multiaxial CT images of the head were performed without the use of


intravenous contrast.  A dose lowering technique was utilized adhering to the


principles of ALARA.








Comparison: None.





Findings: The paranasal sinuses and mastoid air cells are clear. The calvarium


and skull base are intact. The ventricles and sulci are within normal limits.


There is no mass, hematoma, midline shift, or acute infarct.





Impression:


No acute intracranial abnormality.


CXR normal





EKG


new T wave inversion in leads III, avf





Impression


Assessment and Plan


68 yo male presents to the ED after 2 weeks of flu-like symptoms, chest 

tightness and dyspnea with exertion. Flu-like illness, Generalized weakness, 

Weight loss (15lbs on 2 weeks)


cardiac vs infectious vs systolic CHF vs failure to thrive





Flu-like illness, generalized weakness, weight loss


-Blood cultures pending 


-UA pending 


-Urine culture pending


-CXR unremarkable for cardiopulmonary pathology  


-Rapid Flu pending 


-Lactic Acid 2.7


-Empirically treating with IV Vanc/Zosyn 





Chest pain NYD, SOBOE new onset 


-Repeat trop x2 


-EKG in the am and with CP 


-EKG in the ED showed new T wave inversions in inferior leads 


-ECHO ordered for tomorrow 


-NPO except for water and ice chips 


-Advance diet in the morning pending ACS rule out 


-Daily weights, follow I/O 


-Orthostatic BP in the AM





ARIEL--possibly 2/2 to malnourishment, dehydration, hyperglycemia 


-Patient received 2 L bolus in the ED and hypotension has been responsive to 

fluids 


-Will continue  mls/hr


-BMP in the am 





DMII uncontrolled 


-Glucose 327 in the ED


-4 units apart given in the ED 


-hold home glyburide, metformin


-Start sliding scale insulin


-Glucose checks--Q6 hr while NPO, ac/hs with diet advancement 





Hyperkalemia 


-Possibly 2/2 ARIEL


-No conduction abnormality seen on EKG that would require Ca2+ gluconate. 





Hypernatremia 


-Repeat BMP in the am 





CABG/HLD/Bradycardia/Hypotension 


-hold metoprolol 


-hold lisinopril 


-Cont simvastatin 


-hold flomax (bladder scan/straight cath if retaining)





DVT ppx SCD, will revisit after acs rule out 


Full code





Level of Care


Telemetry





Advanced Directives


Existing Living Will:  No


Existing Power of :  No





VTE Prophylaxis


VTE Risk Assessment Done? Y/N:  Yes


Risk Level:  Moderate


Given or contraindicated:  SCD's





Reviewed:  Pt Seen/Exam by Me


History


68 y/o M with c/o weakness, dizziness, muscle cramps for 2-3 wks and decreased 

appetite for 3-4 mths. 


had recently been taken off of his chronic narcotics and had nerve ablation 

done later on 


wife reports increased stress in recent months


Constitutional:  denies: fever


Respiratory:  negative: short of breath


Cardiovascular:  denies chest pain


General Appearance:  no apparent distress


Respiratory:  lungs clear, no respiratory distress


Cardiovascular:  regular rate, rhythm


Neurologic/Psychiatric:  alert, oriented x 3


Skin Characteristics:  warm/dry


Assessment/Plan


Resident Physician Supervision Note:


I independently interviewed and examined the patient and verified the key 

history and physical, reviewed labs and image studies, discussed the case with 

the resident Dr. Dia and agree with the findings and care plan.

## 2018-02-06 NOTE — DIAGNOSTIC IMAGING REPORT
CHEST ONE VIEW PORTABLE



CLINICAL HISTORY: 67 years-old Male presenting with Sepsis. 



TECHNIQUE: Portable upright AP view of the chest was obtained.



COMPARISON: 9/11/2013.



FINDINGS:

Median sternotomy wires and mediastinal surgical clips unchanged.

Atherosclerosis of aortic arch. Chronic silhouette normal in size. Lungs and

pleural spaces clear. Osseous structures normal. Upper abdomen normal.



IMPRESSION:

1.  No acute cardiopulmonary disease.







Electronically signed by:  Senthil Cabral M.D.

2/6/2018 1:20 PM



Dictated Date/Time:  2/6/2018 1:20 PM

## 2018-02-07 VITALS
OXYGEN SATURATION: 98 % | HEART RATE: 57 BPM | DIASTOLIC BLOOD PRESSURE: 88 MMHG | TEMPERATURE: 98.06 F | SYSTOLIC BLOOD PRESSURE: 147 MMHG

## 2018-02-07 VITALS
HEART RATE: 54 BPM | SYSTOLIC BLOOD PRESSURE: 142 MMHG | TEMPERATURE: 98.06 F | OXYGEN SATURATION: 96 % | DIASTOLIC BLOOD PRESSURE: 74 MMHG

## 2018-02-07 VITALS
HEART RATE: 65 BPM | SYSTOLIC BLOOD PRESSURE: 161 MMHG | OXYGEN SATURATION: 96 % | TEMPERATURE: 97.7 F | DIASTOLIC BLOOD PRESSURE: 84 MMHG

## 2018-02-07 VITALS
HEART RATE: 57 BPM | TEMPERATURE: 98.24 F | SYSTOLIC BLOOD PRESSURE: 150 MMHG | OXYGEN SATURATION: 97 % | DIASTOLIC BLOOD PRESSURE: 86 MMHG

## 2018-02-07 VITALS
HEART RATE: 65 BPM | DIASTOLIC BLOOD PRESSURE: 95 MMHG | OXYGEN SATURATION: 98 % | TEMPERATURE: 98.42 F | SYSTOLIC BLOOD PRESSURE: 158 MMHG

## 2018-02-07 LAB
BASOPHILS # BLD: 0.02 K/UL (ref 0–0.2)
BASOPHILS NFR BLD: 0.3 %
BUN SERPL-MCNC: 31 MG/DL (ref 7–18)
CALCIUM SERPL-MCNC: 9.5 MG/DL (ref 8.5–10.1)
CO2 SERPL-SCNC: 23 MMOL/L (ref 21–32)
CREAT SERPL-MCNC: 1.31 MG/DL (ref 0.6–1.4)
EOS ABS #: 0.09 K/UL (ref 0–0.5)
EOSINOPHIL NFR BLD AUTO: 112 K/UL (ref 130–400)
FLUAV RNA SPEC QL NAA+PROBE: (no result)
FLUBV RNA SPEC QL NAA+PROBE: (no result)
GLUCOSE SERPL-MCNC: 135 MG/DL (ref 70–99)
HCT VFR BLD CALC: 36.2 % (ref 42–52)
HGB BLD-MCNC: 12.8 G/DL (ref 14–18)
IG#: 0.01 K/UL (ref 0–0.02)
IMM GRANULOCYTES NFR BLD AUTO: 30.9 %
LYMPHOCYTES # BLD: 1.91 K/UL (ref 1.2–3.4)
MCH RBC QN AUTO: 31.3 PG (ref 25–34)
MCHC RBC AUTO-ENTMCNC: 35.4 G/DL (ref 32–36)
MCV RBC AUTO: 88.5 FL (ref 80–100)
MONO ABS #: 0.5 K/UL (ref 0.11–0.59)
MONOCYTES NFR BLD: 8.1 %
NEUT ABS #: 3.66 K/UL (ref 1.4–6.5)
NEUTROPHILS # BLD AUTO: 1.5 %
NEUTROPHILS NFR BLD AUTO: 59 %
NRBC BLD AUTO-RTO: 0 %
NUCLEATED RED BLOOD CELL ABS: 0 K/UL (ref 0–0)
PMV BLD AUTO: 11.1 FL (ref 7.4–10.4)
POTASSIUM SERPL-SCNC: 4.6 MMOL/L (ref 3.5–5.1)
RED CELL DISTRIBUTION WIDTH CV: 13 % (ref 11.5–14.5)
RED CELL DISTRIBUTION WIDTH SD: 42 FL (ref 36.4–46.3)
SODIUM SERPL-SCNC: 139 MMOL/L (ref 136–145)
WBC # BLD AUTO: 6.19 K/UL (ref 4.8–10.8)

## 2018-02-07 RX ADMIN — PIPERACILLIN AND TAZOBACTAM SCH MLS/HR: 3; .375 INJECTION, POWDER, LYOPHILIZED, FOR SOLUTION INTRAVENOUS; PARENTERAL at 01:50

## 2018-02-07 RX ADMIN — INSULIN ASPART SCH UNITS: 100 INJECTION, SOLUTION INTRAVENOUS; SUBCUTANEOUS at 11:55

## 2018-02-07 RX ADMIN — SIMVASTATIN SCH MG: 40 TABLET, FILM COATED ORAL at 08:02

## 2018-02-07 RX ADMIN — INSULIN ASPART SCH UNITS: 100 INJECTION, SOLUTION INTRAVENOUS; SUBCUTANEOUS at 18:25

## 2018-02-07 RX ADMIN — DICLOFENAC SODIUM SCH APPLN: 10 GEL TOPICAL at 20:18

## 2018-02-07 RX ADMIN — DICLOFENAC SODIUM SCH APPLN: 10 GEL TOPICAL at 16:54

## 2018-02-07 RX ADMIN — ACETAMINOPHEN PRN MG: 325 TABLET ORAL at 15:16

## 2018-02-07 RX ADMIN — PIPERACILLIN AND TAZOBACTAM SCH MLS/HR: 3; .375 INJECTION, POWDER, LYOPHILIZED, FOR SOLUTION INTRAVENOUS; PARENTERAL at 17:50

## 2018-02-07 RX ADMIN — INSULIN ASPART SCH UNITS: 100 INJECTION, SOLUTION INTRAVENOUS; SUBCUTANEOUS at 00:00

## 2018-02-07 RX ADMIN — ACETAMINOPHEN PRN MG: 325 TABLET ORAL at 01:53

## 2018-02-07 RX ADMIN — PIPERACILLIN AND TAZOBACTAM SCH MLS/HR: 3; .375 INJECTION, POWDER, LYOPHILIZED, FOR SOLUTION INTRAVENOUS; PARENTERAL at 10:50

## 2018-02-07 RX ADMIN — SODIUM CHLORIDE SCH MLS/HR: 900 INJECTION, SOLUTION INTRAVENOUS at 05:55

## 2018-02-07 RX ADMIN — INSULIN ASPART SCH UNITS: 100 INJECTION, SOLUTION INTRAVENOUS; SUBCUTANEOUS at 06:00

## 2018-02-07 NOTE — DIAGNOSTIC IMAGING REPORT
CT ABD/PELVIS IV AND ORAL CONT



CLINICAL HISTORY: Tongue carcinoma. Unexpected 15 pound weight loss, nausea,

loss of appetite.    



COMPARISON STUDY:  5/27/2015



TECHNIQUE: Following the IV administration of 94 mL of Optiray-320, CT scan of

the abdomen and pelvis was performed from the lung bases to the proximal femurs.

Images are reviewed in the axial, sagittal, and coronal planes. IV contrast was

administered without complication.  A dose lowering technique was utilized

adhering to the principles of ALARA.





CT DOSE:    



FINDINGS:



Lower chest: The heart is normal in size and configuration, without pericardial

effusion. The lung bases and pleural spaces are clear.



Liver: The liver has a cirrhotic morphology. There are proximally 10 subtle

subcentimeter hepatic hypodensities. These are too small to characterize with

likely diagnostic considerations to include regenerating nodules, metastatic

disease, or hepatocellular carcinoma. These lesions would best be evaluated and

followed up with MRI.



Gallbladder: Unremarkable.



Spleen: Normal in size and attenuation.



Pancreas: Unremarkable.



Adrenal glands: Unremarkable.



Kidneys: No solid renal masses are visualized. There is a tiny nonobstructing

left renal calculus. There is mild prominence of the right renal collecting

system. No ureteral calculi are visualized.



Bowel: There are no transition zones indicate bowel obstruction. There is

colonic diverticulosis. There is no acute diverticulitis. There is no acute

appendicitis..



Peritoneum: There is no intraperitoneal free air or abdominal ascites.



Vasculature: The abdominal aorta is normal in course and caliber.



Adenopathy: There is no definitively pathologic adenopathy. Several mildly

prominent juliette hepatis lymph nodes and peripancreatic lymph nodes remain

unchanged the prior study



Pelvic viscera: The bladder, and pelvic viscera are unremarkable.



Skeletal structures: No destructive osseous lesions are seen.



IMPRESSION:  

1. Cirrhotic morphology of the liver

2. Multiple subcentimeter hepatic hypodense lesions, slightly more conspicuous

than on the prior study perhaps secondary to current contrast administration.

Follow-up and surveillance would best be performed via MRI.

3. Mildly prominent upper abdominal lymph nodes remain stable.

4. Diverticulosis. No evidence of acute diverticulitis

5. No evidence of bowel obstruction. No evidence of free air







Electronically signed by:  Mario Hearn M.D.

2/7/2018 1:08 PM



Dictated Date/Time:  2/7/2018 12:54 PM

## 2018-02-07 NOTE — ECHOCARDIOGRAM REPORT
*NOTICE TO RECEIVING PARTY AGENCY**  This information is strictly Confidential and protected under 
[place] law.  [place] law prohibits you from making any further disclosure of this 
information unless further disclosure is expressly permitted by the written consent of the person to 
whom it pertains or is authorized by law.  A general authorization for the release of medical or 
other information is not sufficient for this purpose.  Hospital accepts no responsibility if the 
information is made available to any other person, INCLUDING THE PATIENT.



Interpretation Summary

  *  Name: BRIAN VEE  Study Date: 2018 06:45 AM  BP: 142/74 mmHg

  *  MRN: [id number]  Patient Location: [facility]  HR: 47

  *  : 1950 (M/d/yyyy)  Gender: Male  Height: 70 in

  *  Age: 67 yrs  Ethnicity: [place]  Weight: 182 lb

  *  Ordering Physician: Han Dia

  *  Referring Physician: Easton Harris

  *  Performed By: Davida Mobley RDCS

  *  Accession# [id number]  Account# [id number]

  *  Reason For Study: CHEST PAIN

  *  BSA: 2.0 m2

  *  -- Conclusions --

  *  1. Normal LV size.  Mild concentric LVH.

  *  2. Normal LV systolic function.  LVEF 55-60 %.  Moderate inferolateral hypokinesis.

  *  3. Normal RV size and function.

  *  4. No significant valvular pathology.

  *  5. Normal estimated PA and RA pressures.

  *  6. Compared with prior study on 2015: No significant change

Procedure Details

  *  A complete two-dimensional transthoracic echocardiogram was performed (2D, M-mode, Doppler and 
color flow Doppler).

Left Ventricle

  *  The left ventricle is grossly normal size.

  *  There is normal left ventricular wall thickness.

  *  There is mild concentric left ventricular hypertrophy.

  *  Ejection Fraction = 55-60%.

  *  There is moderate anterior wall hypokinesis.

Right Ventricle

  *  The right ventricle is grossly normal size.

  *  The right ventricular systolic function is normal as assessed by tricuspid annular plane 
systolic excursion (TAPSE) (normal >1.5 cm).

Atria

  *  The left atrium is mildly dilated.

  *  Right atrial size is normal.

  *  Lipomatous hypertrophy of the interatrial septum is noted.

  *  No ASD detected; PFO is not assessed.

Mitral Valve

  *  The mitral valve is grossly normal.

  *  There is no mitral valve stenosis.

  *  There is trace mitral regurgitation.

Tricuspid Valve

  *  There is trace tricuspid regurgitation.

Aortic Valve

  *  The aortic valve opens well.

  *  The aortic valve is trileaflet.

  *  No hemodynamically significant valvular aortic stenosis.

  *  There is no significant aortic regurgitation.

Pulmonic Valve

  *  The pulmonic valve is not well visualized.

Great Vessels

  *  The aortic root and proximal ascending aorta are normal sized.

Pericardium/Pleural

  *  There is no pericardial effusion.

Great Vessels

  *  Normal inferior vena cava size and collapsability with sniff indicates a normal right atrial 
pressure of 3 mmHg

  *  There is no evidence of pulmonary hypertension. The PA systolic pressure is less than 36 mmHg.



MMode 2D Measurements and Calculations

IVSd 1.3 cm

IVSs 1.8 cm



LVIDd 4.4 cm

LVIDs 3.2 cm

LVPWd 1.4 cm

LVPWs 1.6 cm



IVS/LVPW 0.88 

FS 28.0 %

EDV(Teich) 88.0 ml

ESV(Teich) 40.2 ml

EF(Teich) 54.3 %



EDV(cubed) 85.5 ml

ESV(cubed) 32.0 ml

EF(cubed) 62.6 %

% IVS thick 44.3 %

% LVPW thick 12.0 %





LV mass(C)d 225.7 grams

LV mass(C)dI 112.5 grams/m\S\2

LV mass(C)s 211.4 grams

LV mass(C)sI 105.4 grams/m\S\2



SV(Teich) 47.8 ml

SI(Teich) 23.8 ml/m\S\2

SV(cubed) 53.5 ml

SI(cubed) 26.7 ml/m\S\2



Ao root diam 3.3 cm

Ao root area 8.4 cm\S\2

LA dimension 3.4 cm



LA/Ao 1.0 





LVAd ap4 33.0 cm\S\2

LVLd ap4 8.6 cm

EDV(MOD-sp4) 107.3 ml

EDV(sp4-el) 107.6 ml

LVAs ap4 18.9 cm\S\2

LVLs ap4 6.7 cm

ESV(MOD-sp4) 46.9 ml

ESV(sp4-el) 45.6 ml

EF(MOD-sp4) 56.3 %

EF(sp4-el) 57.7 %



LVAd ap2 32.0 cm\S\2

LVLd ap2 8.4 cm

EDV(MOD-sp2) 101.7 ml

EDV(sp2-el) 103.9 ml

LVAs ap2 18.9 cm\S\2

LVLs ap2 6.8 cm

ESV(MOD-sp2) 45.3 ml

ESV(sp2-el) 44.8 ml

EF(MOD-sp2) 55.5 %

EF(sp2-el) 56.9 %



LVLd %diff -2.53 %

EDV(MOD-bp) 104.9 ml

LVLs %diff 1.7 %

ESV(MOD-bp) 46.3 ml

EF(MOD-bp) 55.8 %



SV(MOD-sp4) 60.4 ml

SI(MOD-sp4) 30.1 ml/m\S\2





SV(MOD-sp2) 56.4 ml

SI(MOD-sp2) 28.1 ml/m\S\2



SV(MOD-bp) 58.5 ml

SI(MOD-bp) 29.2 ml/m\S\2



SV(sp4-el) 62.1 ml

SI(sp4-el) 31.0 ml/m\S\2



SV(sp2-el) 59.1 ml

SI(sp2-el) 29.5 ml/m\S\2







Doppler Measurements and Calculations

MV E max artie 70.8 cm/sec

MV A max artie 85.9 cm/sec



MV E/A 0.82 



MV dec time 0.37 sec



Ao V2 max 176.5 cm/sec

Ao max PG 12.5 mmHg

Ao max PG (full) 7.3 mmHg





LV V1 max PG 5.2 mmHg



LV V1 max 113.8 cm/sec

## 2018-02-07 NOTE — DIAGNOSTIC IMAGING REPORT
CHEST CT WITH CONTRAST



HISTORY: Acute shortness of breath  SOB with exertion, new onset 



TECHNIQUE: Multiaxial CT images of the chest were performed following the

intravenous administration of contrast.  A dose lowering technique was utilized

adhering to the principles of ALARA.



COMPARISON:  Chest radiographs 9/11/2013, CT abdomen and pelvis of same day.



FINDINGS: 

Thyroid is homogeneous. No pathologically enlarged lymph nodes by CT size

criteria. Heart is normal in size without pericardial effusion. Coronary

arterial calcifications are noted. Prior median sternotomy and CABG. Mitral

annular calcifications are also present. No aortic aneurysm or dissection

identified. The imaged great vessels appear to be patent. The opacified

pulmonary arterial tree is also unremarkable.



There is no pneumothorax, pleural effusion, focal airspace consolidation or

overt pulmonary edema. There are no suspicious pulmonary nodules or masses

identified. Subcentimeter subpleural calcified granuloma of the right upper

lobe. Central airways are patent.



No acute abnormality of the imaged upper abdomen. Cirrhotic morphology of the

liver with scattered low attenuating hepatic lesions, further discussed on CT

abdomen and pelvis of same day. Soft tissues are unremarkable. The bones appear

intact. Degenerative changes are seen within the shoulders and spine.



IMPRESSION: 



1. No acute intrathoracic abnormality identified. No lobar airspace

consolidation to suggest pneumonia.

2. No pathologic adenopathy.

3. Prior median sternotomy and CABG.

4. Cirrhotic morphology of the liver. 







Electronically signed by:  Suman Whittaker M.D.

2/7/2018 1:02 PM



Dictated Date/Time:  2/7/2018 12:55 PM

## 2018-02-07 NOTE — DIAGNOSTIC IMAGING REPORT
SOFT TISSUE NECK WITH



CLINICAL HISTORY: 67 years-old Male presenting with hoarsness, PMH of tongue

cancer . 



TECHNIQUE: Multidetector CT of the neck was performed after the administration

of intravenous contrast. IV contrast: 94 mL of Optiray 320. A dose lowering

technique was used consistent with the principles of ALARA (as low as reasonably

achievable).



COMPARISON: None.



CT DOSE (mGy.cm): The estimated cumulative dose is 2012.08 mGy.cm.



FINDINGS:



 topogram: Median sternotomy wires and mediastinal surgical clips noted.



Atherosclerosis of the aortic arch. Patent cervical vessels though

atherosclerosis of the carotid bifurcations results in minimal narrowing of the

origins of the bilateral internal carotid arteries.



Minimal asymmetric effacement of the left glossotonsillar sulcus although there

is no well-defined nodular hyperenhancing soft tissue. Mild effacement of the

left vallecula in comparison to the right. Piriform sinuses preserved. The true

and false vocal folds are symmetric and preserved. No infiltration of the

preepiglottic or paraglottic fat planes. Parapharyngeal fat planes preserved.



The left submandibular gland is absent likely from prior resection. Slight

retractile scarring of the left platysma immediately inferior to the level of

the hyoid bone.



No lymphadenopathy.



Degenerative changes of the spine as well as anterior cervical fusion at C3-4

and C5-6. No destructive osseous lesion. Paranasal sinuses and mastoid air cells

clear. Lung apices clear.



IMPRESSION:

1.  Postsurgical and post treatment changes of the left base of the tongue with

no suspicious nodular hyper enhancing soft tissue to suggest a residual or

recurrent mass. No lymphadenopathy.



2.  Normal appearance of the vocal folds.







Electronically signed by:  Senthil Cabral M.D.

2/7/2018 1:06 PM



Dictated Date/Time:  2/7/2018 12:56 PM

## 2018-02-07 NOTE — CLINICAL DOCUMENTATION QUERY
**** CLINICAL DOCUMENTATION QUERY****



67 year old male who presents to the Emergency Room with complaints of worsening 
generalized illness



In your clinical opinion is this patient being managed for:

    

                        (  ) Possible Sepsis due to unknown cause evidenced by SOFA score 
>=2 treated with boluses and multiple IV antibiotics.

                        (  ) Not Agree



                        (  ) Other explanation of clinical findings (Please Explain)

                        (  ) Unable to determine (Please Define)

                        (  ) Need to Discuss

                        



The medical record reflects the following clinical findings, treatment, and risk factors.  
  



Clinical Indicators: SOFA score of >=2 (3 by my count). Lactic acid 2.7, Creatinine 1.95, 
total bilirubin 1.2, Platelet count of 112, 



Treatment: IV Vancomycin, IV Zosyn, multiple IVF boluses, Influenza PCR for A & B, 



Risk Factors: Age, weight loss, ?of influenza.



Please clarify and document your clinical opinion in the progress notes and discharge 
summary. Terms such as "probable", "suspected", "likely", "questionable", "possible", or 
"still to be ruled out" are acceptable. 



*****IF IN AGREEMENT, YOU MUST DOCUMENT ABOVE DIAGNOSTIC STATEMENT IN DAILY PROGRESS NOTES 
AND DISCHARGE SUMMARY. This document is not part of the patient's record.*****



The Sequential Organ Failure Assessment (SOFA) scorea

Organ system

SOFA score





0

1

2

3

4



Respiratory, PO2/FiO2, mmHg (kPa)

=400 (53.3)

<400 (53.3)

<300 (40)

<200 (26.7) with respiratory support

<100 (13.3) with respiratory



Coagulation, Platelets, x103/mm3

=150

<150

<100

<50

<20



Liver, Bilirubin, mg/dL

<1.2

1.2-1.9

2.0-5.9

6.0-11.9

>12.0



Cardiovascular

MAP =70 mmHg

MAP <70 mmHg

Dopamine <5 or dobutamine (any dose)b

Dopamine 5.1-15 or epinephrine =0.1 or norepinephrine =0.1b

Dopamine >15 or epinephrine >0.1 or norepinephrine >0.1b



Central nervous system, Ayo Coma Scale

15

13-14

10-12

6-9

<6



Renal, Creatinine, mg/dL. Urine output, mL/d

<1.2

1.2-1.9

2.0-3.4

3.5-4.9<500

>5.0<200



a, adapted from Abdiel et al. (7); b, Catecholamine doses are given as ug/kg/min for at 
least 1 hour. FiO2, fraction of inspired oxygen; MAP, mean arterial pressure; PO2, partial 
pressure of oxygen.





Thank You, Hunter Montelongo RN  [phone]

## 2018-02-07 NOTE — FAMILY MEDICINE PROGRESS NOTE
Progress Note


Date of Service


Feb 7, 2018.





Subjective


Pt evaluation today including:  conversation w/ patient, conversation w/ family

, physical exam, chart review, lab review


Patient continues to feel weak, dizzy and fatigued.





   Constitutional:  No fever, No chills, No fatigue


   Respiratory:  No cough, No sputum, No wheezing, No shortness of breath


   Cardiovascular:  No chest pain, No palpitations


   Abdomen:  + nausea, No pain, No vomiting, No diarrhea


   Male :  No dysuria





Medications





Current Inpatient Medications








 Medications


  (Trade)  Dose


 Ordered  Sig/Ney


 Route  Start Time


 Stop Time Status Last Admin


Dose Admin


 


 Aspirin


  (Ecotrin Tab)  325 mg  Q2D


 PO  2/8/18 09:00


 3/10/18 08:59   


 


 


 Simvastatin


  (Zocor Tab)  40 mg  DAILY


 PO  2/7/18 09:00


 3/9/18 08:59  2/7/18 08:02


40 MG


 


 Acetaminophen


  (Tylenol Tab)  650 mg  Q4H  PRN


 PO  2/6/18 17:15


 3/8/18 17:14  2/7/18 15:16


650 MG


 


 Al Hydrox/Mg


 Hydrox/Simethicone


  (Maalox Max Susp)  15 ml  Q4H  PRN


 PO  2/6/18 17:15


 3/8/18 17:14   


 


 


 Magnesium


 Hydroxide


  (Milk Of


 Magnesia Susp)  30 ml  Q12H  PRN


 PO  2/6/18 17:15


 3/8/18 17:14   


 


 


 Ondansetron HCl


  (Zofran Inj)  4 mg  Q6H  PRN


 IV  2/6/18 17:15


 3/8/18 17:14   


 


 


 Polyethylene


  (Miralax Powder


 Packet)  17 gm  DAILY  PRN


 PO  2/6/18 17:15


 3/8/18 17:14   


 


 


 Glucose


  (Glucose 40% Gel)  15-30


 GRAMS 15


 GRAMS...  UD  PRN


 PO  2/6/18 20:00


 3/8/18 19:59   


 


 


 Glucose


  (Glucose Chew


 Tab)  4-8


 Tablets 4


 Tabl...  UD  PRN


 PO  2/6/18 20:00


 3/8/18 19:59   


 


 


 Dextrose


  (Dextrose 50%


 50ML Syringe)  25-50ML OF


 50% DW IV


 FOR...  UD  PRN


 IV  2/6/18 20:00


 3/8/18 19:59   


 


 


 Glucagon


  (Glucagon Inj)  1 mg  UD  PRN


 SQ  2/6/18 20:00


 3/8/18 19:59   


 


 


 Piperacillin Sod/


 Tazobactam Sod


 3.375 gm/Dextrose  115 ml @ 


 28.75 mls/


 hr  Q8H


 IV  2/7/18 02:00


 2/8/18 17:59  2/7/18 17:50


28.75 MLS/HR


 


 Miscellaneous


 Information


  (Consult)  1 ea  UD  PRN


 N/A  2/6/18 20:00


 2/8/18 19:59   


 


 


 Insulin Aspart


  (novoLOG ASPART)  **SLIDING


 SCALE**


 **G...  Q6


 SC  2/7/18 00:00


 3/9/18 00:00  2/7/18 18:25


1 UNITS


 


 Ioversol


  (Optiray 320)  125 ml  UD  PRN


 IV  2/7/18 09:45


 2/11/18 09:44   


 


 


 Sodium Chloride  500 ml @ 


 50 mls/hr  Q10H ONCE


 IV  2/7/18 15:45


 2/8/18 01:44  2/7/18 16:53


50 MLS/HR


 


 Diclofenac Sodium


  (Voltaren 1% Top


 Gel)  1 appln  QID


 EXT  2/7/18 17:00


 3/9/18 16:59   


 











Objective


Vital Signs











  Date Time  Temp Pulse Resp B/P (MAP) Pulse Ox O2 Delivery O2 Flow Rate FiO2


 


2/7/18 16:00      Room Air  


 


2/7/18 15:15 36.5 65 20 161/84 (109) 96 Room Air  


 


2/7/18 12:00      Room Air  


 


2/7/18 11:12 36.8 57 16 150/86 (107) 97 Room Air  





  57      


 


2/7/18 08:34 36.9 64 18 158/95 (116) 98 Room Air  





  65      





  58      


 


2/7/18 08:00      Room Air  


 


2/7/18 04:00      Room Air  


 


2/7/18 03:41 36.7 54 17 142/74 (96) 96 Room Air  


 


2/6/18 23:59      Room Air  


 


2/6/18 23:13 37.0 55 17 142/71 (94) 97 Room Air  


 


2/6/18 20:00      Room Air  


 


2/6/18 19:20 36.6 52 18 144/72 (96) 100 Room Air  











Physical Exam


General Appearance:  WD/WN, no apparent distress


Neck:  supple, no adenopathy, thyroid normal, no JVD


Respiratory/Chest:  chest non-tender, lungs clear, normal breath sounds, no 

respiratory distress, no accessory muscle use


Cardiovascular:  regular rate, rhythm, no edema, no murmur


Abdomen:  normal bowel sounds, non tender, soft, no organomegaly, no pulsatile 

mass


Extremities:  normal range of motion, normal inspection, no pedal edema, no 

calf tenderness


Neurologic/Psychiatric:  alert, normal mood/affect, oriented x 3





Laboratory Results











  Date Time  Temp Pulse Resp B/P (MAP) Pulse Ox O2 Delivery O2 Flow Rate FiO2


 


2/7/18 16:00      Room Air  


 


2/7/18 15:15 36.5 65 20 161/84 (109) 96 Room Air  


 


2/7/18 12:00      Room Air  


 


2/7/18 11:12 36.8 57 16 150/86 (107) 97 Room Air  





  57      


 


2/7/18 08:34 36.9 64 18 158/95 (116) 98 Room Air  





  65      





  58      


 


2/7/18 08:00      Room Air  


 


2/7/18 04:00      Room Air  


 


2/7/18 03:41 36.7 54 17 142/74 (96) 96 Room Air  


 


2/6/18 23:59      Room Air  


 


2/6/18 23:13 37.0 55 17 142/71 (94) 97 Room Air  


 


2/6/18 20:00      Room Air  


 


2/6/18 19:20 36.6 52 18 144/72 (96) 100 Room Air  











Assessment and Plan


66 yo male presents to the ED after 2 weeks of flu-like symptoms, chest 

tightness and dyspnea with exertion. Flu-like illness, Generalized weakness, 

Weight loss (15lbs on 2 weeks)


cardiac vs infectious vs systolic CHF vs failure to thrive





2/7- After reviewing EKG, trops and ECHO, the patients sx are unlikely cardiac 

in etiology. In addition, the patient has remained afebrile with a normal WBC. 

Cultures, UA, influenza test are unremarkable for acute infection. Decided to 

deescalate abx. After hydration with fluids, patient's Cr recovered today and 

decided to do contrast CT imaging of the neck, thorax and abdomen. Normal 

finding found except for cirrhotic changes and hypodense lesions in the liver. 

These lesions have been seen in previous images, but appear different. Rads 

recommended MRI to rule out mets, HCC. Discussed with GI regarding outpatient 

follow up. Ordering MRI and AFP. Patient ARIEL likely due to dehydration 2/2 

hypoglycemia. 





Flu-like illness, generalized weakness, weight loss


Possible Sepsis due to unknown cause evidenced by SOFA score >=2 treated with 

boluses and multiple IV antibiotics.


-Blood cultures pending 


-UA negative for infection


-Urine culture pending


-CXR unremarkable for cardiopulmonary pathology  


-Rapid Flu and PCR negative


-Lactic Acid 2.7


-Dc'ed Vanc, continue Zosyn (day 1)  





Chest pain NYD, SOBOE new onset 


-Repeat trop x2--negative 


-EKG in the ED showed new T wave inversions in inferior leads 


-ECHO--no significant changes compared to previous studies 


-Daily weights, follow I/O 


-Orthostatic BP in the AM





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


Liver lesions 


-Pt described h/o cirrhosis 


-Seen on abdominal CT--hypodense lesions


-Rads recommends MRI to rule out HCC, mets 


-Ordered AFP in addition 


-Informed [facility] gastro (Dr. Jones) for outpatient follow up





ARIEL--possibly 2/2 to poor oral intake, dehydration, hyperglycemia 


-Resolved 


-Patient received 2 L bolus in the ED and hypotension has been responsive to 

fluids 


-BMP in the am 





Chronic low back pain


-Avoid narcotics due to h/o dependence


-prn tramadol added


-consider pain mx consult





DMII uncontrolled 


-Better controlled today 


-continue to hold home glyburide, metformin


-Continue sliding scale insulin


-Glucose checks--Q6 hr while NPO, ac/hs with diet advancement 





Hyperkalemia 


-resolved 





Hypernatremia 


-Repeat BMP in the am 





CABG/HLD/Bradycardia/Hypotension 


-hold metoprolol 


-hold lisinopril 


-Cont simvastatin 


-hold flomax (bladder scan/straight cath if retaining)





DVT ppx SCD, will revisit after acs rule out 


Full code





Reviewed:  Pt Seen/Exam by Me


History


feeling stronger but the back pain is really bothersome.


Constitutional:  denies: fever


Respiratory:  negative: short of breath


Cardiovascular:  denies chest pain


General Appearance:  no apparent distress


Respiratory:  lungs clear, no respiratory distress


Cardiovascular:  regular rate, rhythm


Neurologic/Psychiatric:  alert, oriented x 3


Skin Characteristics:  warm/dry


Assessment/Plan


Resident Physician Supervision Note:


I independently interviewed and examined the patient and verified the key 

history and physical, reviewed labs and image studies, discussed the case with 

the resident Dr. Dia and agree with the findings and care plan.

## 2018-02-07 NOTE — CLINICAL DOCUMENTATION QUERY
**** CLINICAL DOCUMENTATION QUERY****



67 year old male who presents to the Emergency Room with complaints of worsening 
generalized illness



In your clinical opinion is this patient being managed for:

    

                        ( v ) Possible Sepsis due to unknown cause evidenced by SOFA score 
>=2 treated with boluses and multiple IV antibiotics.

                        (  ) Not Agree



                        (  ) Other explanation of clinical findings (Please Explain)

                        (  ) Unable to determine (Please Define)

                        (  ) Need to Discuss

                        



The medical record reflects the following clinical findings, treatment, and risk factors.  
  



Clinical Indicators: SOFA score of >=2 (3 by my count). Lactic acid 2.7, Creatinine 1.95, 
total bilirubin 1.2, Platelet count of 112, 



Treatment: IV Vancomycin, IV Zosyn, multiple IVF boluses, Influenza PCR for A & B, 



Risk Factors: Age, weight loss, ?of influenza.



Please clarify and document your clinical opinion in the progress notes and discharge 
summary. Terms such as "probable", "suspected", "likely", "questionable", "possible", or 
"still to be ruled out" are acceptable. 



*****IF IN AGREEMENT, YOU MUST DOCUMENT ABOVE DIAGNOSTIC STATEMENT IN DAILY PROGRESS NOTES 
AND DISCHARGE SUMMARY. This document is not part of the patient's record.*****



The Sequential Organ Failure Assessment (SOFA) scorea

Organ system

SOFA score





0

1

2

3

4



Respiratory, PO2/FiO2, mmHg (kPa)

=400 (53.3)

<400 (53.3)

<300 (40)

<200 (26.7) with respiratory support

<100 (13.3) with respiratory



Coagulation, Platelets, x103/mm3

=150

<150

<100

<50

<20



Liver, Bilirubin, mg/dL

<1.2

1.2-1.9

2.0-5.9

6.0-11.9

>12.0



Cardiovascular

MAP =70 mmHg

MAP <70 mmHg

Dopamine <5 or dobutamine (any dose)b

Dopamine 5.1-15 or epinephrine =0.1 or norepinephrine =0.1b

Dopamine >15 or epinephrine >0.1 or norepinephrine >0.1b



Central nervous system, Ayo Coma Scale

15

13-14

10-12

6-9

<6



Renal, Creatinine, mg/dL. Urine output, mL/d

<1.2

1.2-1.9

2.0-3.4

3.5-4.9<500

>5.0<200



a, adapted from Abdiel et al. (7); b, Catecholamine doses are given as ug/kg/min for at 
least 1 hour. FiO2, fraction of inspired oxygen; MAP, mean arterial pressure; PO2, partial 
pressure of oxygen.





Thank You, Hunter Montelongo RN  [phone]

## 2018-02-07 NOTE — PHARMACY PROGRESS NOTE
Pharmacy Abx Dose Short Note


Date of Service


Feb 7, 2018.





Assessment & Plan


Assessment








* 67 year old male admitted yesterday for "flu-like" symptoms (sore throat, 

nasal congestion, HA, dizziness, SOB) 


* Empiric 48 hrs broad spectrum abx therapy has been ordered: vancomycin + Zosyn


* CXR: read as no acute process


* No leukocytosis noted on labs, patient has been afebrile since admission, sat 

well on room air 


* Influ A/B Ag and PCR negative, BLCX's drawn and pending


* ARIEL per admit PRP, SCr 1.95 on admit (baseline ~1.0); renal fxn improving w/ 

hydration (SCr down to 1.31 today and good UO today)








Plan








Vancomycin


* 2000mg loading dose (25mg/kg) x 1 given 2/6 @ 2052 


* Maint dose: 1250mg (~15mg/kg) IV Q 16 hours


* Goal trough level for pulm source : 15 to 20 mcg/mL


* P'kinetic estimates: Vd 0.7L/kg, half-life ~13 hours


* Will check trough level if therapy extended beyond 48 hours





Zosyn


* eCrCl > 20cc/min, BMI 25.4, continue 3.375gm ext-infusion Q 8 hours





Pharmacy will continue to follow and will adjust dose/frequency as necessary.  

Thank you.

## 2018-02-08 VITALS
SYSTOLIC BLOOD PRESSURE: 168 MMHG | DIASTOLIC BLOOD PRESSURE: 98 MMHG | TEMPERATURE: 98.24 F | HEART RATE: 68 BPM | OXYGEN SATURATION: 96 %

## 2018-02-08 VITALS
HEART RATE: 59 BPM | TEMPERATURE: 98.24 F | SYSTOLIC BLOOD PRESSURE: 142 MMHG | OXYGEN SATURATION: 96 % | DIASTOLIC BLOOD PRESSURE: 92 MMHG

## 2018-02-08 VITALS
SYSTOLIC BLOOD PRESSURE: 144 MMHG | HEART RATE: 56 BPM | OXYGEN SATURATION: 96 % | DIASTOLIC BLOOD PRESSURE: 81 MMHG | TEMPERATURE: 98.06 F

## 2018-02-08 VITALS
OXYGEN SATURATION: 98 % | SYSTOLIC BLOOD PRESSURE: 152 MMHG | HEART RATE: 65 BPM | DIASTOLIC BLOOD PRESSURE: 98 MMHG | TEMPERATURE: 98.06 F

## 2018-02-08 VITALS
TEMPERATURE: 98.06 F | SYSTOLIC BLOOD PRESSURE: 152 MMHG | DIASTOLIC BLOOD PRESSURE: 98 MMHG | OXYGEN SATURATION: 98 % | HEART RATE: 65 BPM

## 2018-02-08 LAB
BASOPHILS # BLD: 0.01 K/UL (ref 0–0.2)
BASOPHILS NFR BLD: 0.2 %
BUN SERPL-MCNC: 19 MG/DL (ref 7–18)
BUN SERPL-MCNC: 22 MG/DL (ref 7–18)
CALCIUM SERPL-MCNC: 10.4 MG/DL (ref 8.5–10.1)
CALCIUM SERPL-MCNC: 9.6 MG/DL (ref 8.5–10.1)
CO2 SERPL-SCNC: 23 MMOL/L (ref 21–32)
CO2 SERPL-SCNC: 26 MMOL/L (ref 21–32)
CREAT SERPL-MCNC: 1.17 MG/DL (ref 0.6–1.4)
CREAT SERPL-MCNC: 1.23 MG/DL (ref 0.6–1.4)
EOS ABS #: 0.07 K/UL (ref 0–0.5)
EOSINOPHIL NFR BLD AUTO: 100 K/UL (ref 130–400)
GLUCOSE SERPL-MCNC: 133 MG/DL (ref 70–99)
GLUCOSE SERPL-MCNC: 181 MG/DL (ref 70–99)
HCT VFR BLD CALC: 36.1 % (ref 42–52)
HGB BLD-MCNC: 13 G/DL (ref 14–18)
IG#: 0.02 K/UL (ref 0–0.02)
IMM GRANULOCYTES NFR BLD AUTO: 28.3 %
LYMPHOCYTES # BLD: 1.49 K/UL (ref 1.2–3.4)
MCH RBC QN AUTO: 31.6 PG (ref 25–34)
MCHC RBC AUTO-ENTMCNC: 36 G/DL (ref 32–36)
MCV RBC AUTO: 87.6 FL (ref 80–100)
MONO ABS #: 0.33 K/UL (ref 0.11–0.59)
MONOCYTES NFR BLD: 6.3 %
NEUT ABS #: 3.34 K/UL (ref 1.4–6.5)
NEUTROPHILS # BLD AUTO: 1.3 %
NEUTROPHILS NFR BLD AUTO: 63.5 %
PMV BLD AUTO: 10.8 FL (ref 7.4–10.4)
POTASSIUM SERPL-SCNC: 4.2 MMOL/L (ref 3.5–5.1)
POTASSIUM SERPL-SCNC: 4.3 MMOL/L (ref 3.5–5.1)
RED CELL DISTRIBUTION WIDTH CV: 12.8 % (ref 11.5–14.5)
RED CELL DISTRIBUTION WIDTH SD: 41.1 FL (ref 36.4–46.3)
SODIUM SERPL-SCNC: 135 MMOL/L (ref 136–145)
SODIUM SERPL-SCNC: 136 MMOL/L (ref 136–145)
WBC # BLD AUTO: 5.26 K/UL (ref 4.8–10.8)

## 2018-02-08 RX ADMIN — INSULIN ASPART SCH UNITS: 100 INJECTION, SOLUTION INTRAVENOUS; SUBCUTANEOUS at 11:00

## 2018-02-08 RX ADMIN — PIPERACILLIN AND TAZOBACTAM SCH MLS/HR: 3; .375 INJECTION, POWDER, LYOPHILIZED, FOR SOLUTION INTRAVENOUS; PARENTERAL at 10:00

## 2018-02-08 RX ADMIN — DICLOFENAC SODIUM SCH APPLN: 10 GEL TOPICAL at 12:35

## 2018-02-08 RX ADMIN — PIPERACILLIN AND TAZOBACTAM SCH MLS/HR: 3; .375 INJECTION, POWDER, LYOPHILIZED, FOR SOLUTION INTRAVENOUS; PARENTERAL at 01:33

## 2018-02-08 RX ADMIN — SIMVASTATIN SCH MG: 40 TABLET, FILM COATED ORAL at 07:58

## 2018-02-08 RX ADMIN — DICLOFENAC SODIUM SCH APPLN: 10 GEL TOPICAL at 07:57

## 2018-02-08 RX ADMIN — INSULIN ASPART SCH UNITS: 100 INJECTION, SOLUTION INTRAVENOUS; SUBCUTANEOUS at 00:00

## 2018-02-08 RX ADMIN — INSULIN ASPART SCH UNITS: 100 INJECTION, SOLUTION INTRAVENOUS; SUBCUTANEOUS at 07:00

## 2018-02-08 NOTE — DISCHARGE INSTRUCTIONS
Discharge Instructions


Date of Service


Feb 8, 2018.





Admission


Reason for Admission:  Flu-Like Symptoms, Generalized Weakness





Discharge


Discharge Diagnosis / Problem:  Dehydration





Discharge Goals


Goal(s):  Improve function, Increase independence, Improve disease control





Activity Recommendations


Activity Limitations:  resume your previous activity





.





Instructions / Follow-Up


Instructions / Follow-Up


Mr. Bergeron, 





Saúl came to [facility] with flu-like symptoms including 

dizziness, weight loss, fatigue and shortness of breath. The goal of your stay 

was to investigate potential causes including; heart issues, infections and 

cancer. When you came into the hospital, you were dehydrated so we started you 

on fluids and antibiotics for potential infection. 





The test for you heart showed normal function compared to previous studies. In 

addition, you did not have a fever and infectious test have been negative. We 

did imaging of you neck, chest and abdomen to check for potential lesions. The 

imaging showed cirrhotic changes of the liver. 





While we have not determined a single cause for your symptoms, we have been 

able to exclude important causes that would require further hospitalization and 

intervention. We recommend that your next step take place with your primary 

care doctor. As you have remarked, you blood glucose and hypertension has been 

uncontrolled since you recently quitting opioid medications. High blood sugar 

can cause you to be dehydrated and have electrolyte imbalances. In addition, 

variations in blood pressure can cause these symptoms all well. As we discussed

, it appears you have been under increased stress lately. All these things 

should be discussed with your primary care doctor. Finally, we recommend follow 

up with gastroenterology (Dr. Jones) for continued surveillance and prevention 

of liver cirrhosis.





Continue previous medication with close follow up this week for blood glucose 

and blood pressure checks.





Current Hospital Diet


Patient's current hospital diet: Diabetes Type 2 Diet





Discharge Diet


Recommended Diet:  Regular Diet





Pending Studies


Studies pending at discharge:  no





Laboratory Results





Hemoglobin A1c








Test


  1/17/18


09:15 Range/Units


 


 


Estimated Average Glucose 128   mg/dl


 


Hemoglobin A1c 6.1 H 4.5-5.6  %








Lipid Panel








Test


  1/17/18


09:15 Range/Units


 


 


Triglycerides Level 103  0-150  mg/dl


 


Cholesterol Level 129  0-200  mg/dl


 


HDL Cholesterol 42   mg/dl


 


LDL Cholesterol Direct 82   mg/dl


 


Cholesterol/HDL Ratio 3.1   


 


LDL Cholesterol, Calculated    mg/dl











Medical Emergencies








.


Who to Call and When:





Medical Emergencies:  If at any time you feel your situation is an emergency, 

please call 911 immediately.





.





Non-Emergent Contact


Non-Emergency issues call your:  Primary Care Provider, Gastroenterologist





.


.








"Provider Documentation" section prepared by Han Dia.








.





VTE Core Measure


Inpt VTE Proph given/why not?:  SCD's

## 2018-02-08 NOTE — DISCHARGE SUMMARY
Discharge Summary


Date of Service


Feb 8, 2018.





Discharge Summary


Admission Date:


Feb 6, 2018 at 17:42


Discharge Date:  Feb 8, 2018


Discharge Disposition:  Home


Principal Diagnosis:  dehydration


Immunizations:  


   History of Tetanus Vaccine?:  Unknown


   History of Pneumococcal:  No


   History of Hepatitis B Vaccine:  No





Discharge Exam


Review of Systems:  


   Constitutional:  + weakness, + fatigue, No fever, No chills, No sweats


   Respiratory:  No cough, No sputum, No wheezing, No shortness of breath, No 

dyspnea on exertion


   Cardiovascular:  No chest pain, No palpitations


   Abdomen:  No pain, No nausea, No vomiting, No diarrhea


   Genitourinary - Female:  No dysuria


Physical Exam:  


   General Appearance:  WD/WN, no apparent distress


   Respiratory/Chest:  chest non-tender, lungs clear, normal breath sounds


   Cardiovascular:  regular rate, rhythm, no edema, + diastolic murmur


   Abdomen / GI:  normal bowel sounds, non tender, soft


   Extremities:  normal inspection, no calf tenderness


   Neurologic/Psychiatric:  alert, normal mood/affect, oriented x 3


   Skin:  normal color, warm/dry, no rash





Hospital Course


68 yo male PMH of CABG x3 and lingual neoplasm comes to [facility] after 2 weeks of 

flu-like symptoms. Predominant sx at admission included; weakness, UWL of 15lbs 

and decreased appetite, dyspnea with exertion. 





Diagnostic goal was to rule-out life threatening causes of symptoms; ACS, 

sepsis and neoplastic. 





The patient remained afebrile and had a normal WBC. In addition, flu PCR, blood 

cultures and urine cultures were negative.  


The patient received a ECHO, EKG and serial trop. These test were unremarkable 

for ACS and ECHO looked similar to past studies. During this period we held his 

Metoprolol. 


Also, Patient had a brief, asymptomatic episode of VTACH prior to discharge. 

The patient was found to have low magnesium. Mg was replaced and the patient 

was instructed to restart home dose of Metoprolol at discharge.





Initially the patient was hypotensive and was shown to have an elevated Cr and 

grossly elevated glucose. The patient was started on IVF and insulin. His 

hypotension and elevated Cr were responsive to fluid. 





CT of the neck, thorax and abdomen were unremarkable aside from a mention of 

hypodense lesions of the liver. MRI of the liver suggested that the lesions 

were cirrhotic changes. Patient has been evaluated in the past by Dr. Jones 

regarding these changes. Nonetheless, AFP was pending at discharge and f/u with 

GI is recommended.  


If continues to have decreased appetite will need continue GI evaluation. 





The patient remarks that since quitting opioid medications (10 year history for 

lower back pain) one month ago that his blood sugar and blood pressures have 

been labile. 


Suspect component of anxiety and depression contributing to symptoms due to 

chronic pain and recent loss of family of pets. Patient reflects sadness, but 

denies suicide ideation at this time. Would recommend following up with patient 

mood in the outpatient setting.


Total Time Spent:  Greater than 30 minutes


This includes examination of the patient, discharge planning, medication 

reconciliation, and communication with other providers.





Discharge Instructions


Please refer to the electronic Patient Visit Report (Discharge Instructions) 

for additional information.





Additional Copies To


Easton Hraris M.D.





Reviewed:  Pt Seen/Exam by Me


History


feeling much better. continues to have back pain much better than yesterday.


Constitutional:  denies: fever


Respiratory:  negative: short of breath


Cardiovascular:  denies chest pain, denies palpitations


General Appearance:  no apparent distress


Respiratory:  lungs clear, no respiratory distress


Cardiovascular:  regular rate, rhythm


Gastrointestinal:  normal bowel sounds, non tender, soft


Neurologic/Psychiatric:  alert, oriented x 3


Skin Characteristics:  warm/dry


Assessment/Plan


Resident Physician Supervision Note:


I independently interviewed and examined the patient and verified the key 

history and physical, reviewed labs and image studies, discussed the case with 

the resident Dr. Dia and agree with the findings and care plan.


Time spent in discharge 40min

## 2018-02-08 NOTE — DIAGNOSTIC IMAGING REPORT
MRI LIVER COMBO



CLINICAL HISTORY: Hepatic masses. Weight loss. TONGUE CARCINOMA



TECHNIQUE: Imaging was performed prior to and following IV contrast injection

(9.8 cc intravenous Eovist).



COMPARISON STUDY:  CT scan of the abdomen pelvis dated 2/7/2018



FINDINGS: 



There is mild splenomegaly.



There is no ductal dilatation.



No solid renal masses are visualized.



The pancreatic masses are visualized.



There is no evidence of abdominal aortic aneurysm.



There is a 7 mm hepatic cyst adjacent to the gallbladder fossa. There is a 3 mm

hepatic cyst within the right lobe posteriorly.



There are no hepatic lesions demonstrating suspicious restricted water

diffusion.



The liver has a cirrhotic morphology. There are a few scattered hepatic nodules

which do not demonstrate arterial enhancement and are of relatively low signal

on T2-weighted images. These likely represent regenerative nodules. There are no

hepatic masses viewed as suspicious for metastatic disease or hepatocellular

carcinoma.



IMPRESSION:  

1. Hepatic cirrhosis with scattered subcentimeter hepatic nodules, most

consistent with a combination of hepatic cysts and regenerative nodules. There

are no lesions at the current time viewed as suspicious for metastatic disease

or hepatocellular carcinoma 







Electronically signed by:  Mario Hearn M.D.

2/8/2018 6:48 AM



Dictated Date/Time:  2/8/2018 6:35 AM

## 2018-07-31 ENCOUNTER — HOSPITAL ENCOUNTER (OUTPATIENT)
Dept: HOSPITAL 45 - C.EDA | Age: 68
Setting detail: OBSERVATION
LOS: 1 days | Discharge: HOME | End: 2018-08-01
Attending: FAMILY MEDICINE | Admitting: HOSPITALIST
Payer: COMMERCIAL

## 2018-07-31 VITALS
SYSTOLIC BLOOD PRESSURE: 125 MMHG | DIASTOLIC BLOOD PRESSURE: 65 MMHG | OXYGEN SATURATION: 96 % | TEMPERATURE: 98.06 F | HEART RATE: 47 BPM

## 2018-07-31 VITALS
WEIGHT: 191.36 LBS | HEIGHT: 72 IN | HEIGHT: 72 IN | BODY MASS INDEX: 25.92 KG/M2 | BODY MASS INDEX: 25.92 KG/M2 | WEIGHT: 191.36 LBS

## 2018-07-31 VITALS
OXYGEN SATURATION: 100 % | HEART RATE: 52 BPM | TEMPERATURE: 97.7 F | SYSTOLIC BLOOD PRESSURE: 142 MMHG | DIASTOLIC BLOOD PRESSURE: 78 MMHG

## 2018-07-31 DIAGNOSIS — R55: Primary | ICD-10-CM

## 2018-07-31 DIAGNOSIS — I47.2: ICD-10-CM

## 2018-07-31 DIAGNOSIS — Z82.49: ICD-10-CM

## 2018-07-31 DIAGNOSIS — I25.10: ICD-10-CM

## 2018-07-31 DIAGNOSIS — Z79.82: ICD-10-CM

## 2018-07-31 DIAGNOSIS — N17.9: ICD-10-CM

## 2018-07-31 DIAGNOSIS — Z95.5: ICD-10-CM

## 2018-07-31 DIAGNOSIS — Z83.3: ICD-10-CM

## 2018-07-31 DIAGNOSIS — Z79.899: ICD-10-CM

## 2018-07-31 DIAGNOSIS — E11.9: ICD-10-CM

## 2018-07-31 DIAGNOSIS — E83.42: ICD-10-CM

## 2018-07-31 DIAGNOSIS — E78.5: ICD-10-CM

## 2018-07-31 DIAGNOSIS — I25.2: ICD-10-CM

## 2018-07-31 DIAGNOSIS — Z79.84: ICD-10-CM

## 2018-07-31 LAB
ALBUMIN SERPL-MCNC: 3.6 GM/DL (ref 3.4–5)
ALP SERPL-CCNC: 37 U/L (ref 45–117)
ALT SERPL-CCNC: 32 U/L (ref 12–78)
AST SERPL-CCNC: 26 U/L (ref 15–37)
BASOPHILS # BLD: 0.01 K/UL (ref 0–0.2)
BASOPHILS NFR BLD: 0.3 %
BUN SERPL-MCNC: 19 MG/DL (ref 7–18)
CALCIUM SERPL-MCNC: 9.8 MG/DL (ref 8.5–10.1)
CO2 SERPL-SCNC: 27 MMOL/L (ref 21–32)
CREAT SERPL-MCNC: 1.47 MG/DL (ref 0.6–1.4)
EOS ABS #: 0.18 K/UL (ref 0–0.5)
EOSINOPHIL NFR BLD AUTO: 120 K/UL (ref 130–400)
GLUCOSE SERPL-MCNC: 173 MG/DL (ref 70–99)
HCT VFR BLD CALC: 35 % (ref 42–52)
HGB BLD-MCNC: 12 G/DL (ref 14–18)
IG#: 0 K/UL (ref 0–0.02)
IMM GRANULOCYTES NFR BLD AUTO: 30.7 %
INR PPP: 1.1 (ref 0.9–1.1)
LIPASE: 396 U/L (ref 73–393)
LYMPHOCYTES # BLD: 1.19 K/UL (ref 1.2–3.4)
MCH RBC QN AUTO: 30.8 PG (ref 25–34)
MCHC RBC AUTO-ENTMCNC: 34.3 G/DL (ref 32–36)
MCV RBC AUTO: 89.7 FL (ref 80–100)
MONO ABS #: 0.32 K/UL (ref 0.11–0.59)
MONOCYTES NFR BLD: 8.2 %
NEUT ABS #: 2.18 K/UL (ref 1.4–6.5)
NEUTROPHILS # BLD AUTO: 4.6 %
NEUTROPHILS NFR BLD AUTO: 56.2 %
PMV BLD AUTO: 10.9 FL (ref 7.4–10.4)
POTASSIUM SERPL-SCNC: 4.1 MMOL/L (ref 3.5–5.1)
PROT SERPL-MCNC: 6.8 GM/DL (ref 6.4–8.2)
PTT PATIENT: 21.8 SECONDS (ref 21–31)
RED CELL DISTRIBUTION WIDTH CV: 13 % (ref 11.5–14.5)
RED CELL DISTRIBUTION WIDTH SD: 41.8 FL (ref 36.4–46.3)
SODIUM SERPL-SCNC: 140 MMOL/L (ref 136–145)
WBC # BLD AUTO: 3.88 K/UL (ref 4.8–10.8)

## 2018-07-31 RX ADMIN — METOPROLOL TARTRATE SCH MG: 50 TABLET, FILM COATED ORAL at 20:49

## 2018-07-31 RX ADMIN — MAGNESIUM SULFATE IN DEXTROSE SCH MLS/HR: 10 INJECTION, SOLUTION INTRAVENOUS at 16:41

## 2018-07-31 RX ADMIN — MAGNESIUM SULFATE IN DEXTROSE SCH MLS/HR: 10 INJECTION, SOLUTION INTRAVENOUS at 17:55

## 2018-07-31 RX ADMIN — INSULIN ASPART SCH UNITS: 100 INJECTION, SOLUTION INTRAVENOUS; SUBCUTANEOUS at 16:15

## 2018-07-31 RX ADMIN — SODIUM CHLORIDE SCH MLS/HR: 900 INJECTION, SOLUTION INTRAVENOUS at 16:41

## 2018-07-31 RX ADMIN — INSULIN ASPART SCH UNITS: 100 INJECTION, SOLUTION INTRAVENOUS; SUBCUTANEOUS at 20:51

## 2018-07-31 NOTE — CARDIOLOGY CONSULTATION
Cardiology Consultation


Date of Consultation:


Jul 31, 2018.


Requesting Physician:


Dr. Schroeder


Reason for Consultation:


Syncope


Pt evaluation today including:  conversation w/ patient, physical exam, lab 

review, review of studies, review of inpatient medication list


History of Present Illness


This is a very pleasant 68-year-old gentleman who has a history of coronary 

disease including myocardial infarction and bypass surgery in 2000 at [facility] (per patient, I do not see records).  He has a history of 

sleepiness for which he has been evaluated and per the patient was associated 

with opioid use.  He has long-standing asymptomatic bradycardia.  More recently 

he has experienced several episodes of presyncope and syncope.  One presyncopal 

event occurred February of this year, the syncopal event occurred the day of 

admission.  The episodes are brief and have been observed, he feels they last 

seconds but other observers indicate that they are longer (his wife feels the 

episode was less than 1 minute in duration).  He is not aware of palpitations, 

chest discomfort or shortness of breath around these times.  He has noted no 

change in his exercise ability and has no exertional chest discomfort and has 

never used nitroglycerin.





While in the emergency room he was observed to have a brief run of nonsustained 

ventricular tachycardia at a slow heart rate, around 110 bpm for 13 beats.  

This was asymptomatic.  His heart rate does run slow and there seems to be some 

confusion about his beta-blocker dose.





Past Medical/Surgical History





(1) Diabetes


(2) Heart attack


Coronary artery disease


Bypass surgery





Family History





Cancer


Diabetes mellitus


FH: gallbladder disease


FH: lung disease


Heart disease


Hypertension


Kidney disease





Social History


Smoking Status:  Never Smoker


History of Alcohol Use:  Yes (socially)





Review of Systems


Constitutional:  No fever, No weight loss, No weakness


Respiratory:  No cough, No wheezing, No shortness of breath, No dyspnea on 

exertion


Cardiac:  + see HPI, + problem reported (Syncope), No chest pain, No orthopnea, 

No PND, No edema, No palpitations


Abdomen:  No pain, No nausea, No vomiting, No diarrhea, No GI bleeding


Male :  No urinary frequency, No nocturia more than once/night, No slowing 

stream, No sexual dysfunction


Neurologic:  No paralysis, No weakness, No numbness/tingling, No balance 

problems


Heme:  No abnormal bleeding/bruising, No clotting problems


Endo:  No fatigue


Skin:  No problem reported


Daytime sleepiness


All Other Systems:  Reviewed and Negative





Allergies


Coded Allergies:  


     No Known Allergies (Verified , 7/31/18)





Medications





Current Inpatient Medications








 Medications


  (Trade)  Dose


 Ordered  Sig/Ney


 Route  Start Time


 Stop Time Status Last Admin


Dose Admin


 


 Aspirin


  (Ecotrin Tab)  325 mg  Q2D@0900


 PO  8/1/18 09:00


 8/31/18 08:59   


 


 


 Lisinopril


  (Zestril Tab)  10 mg  DAILY


 PO  8/1/18 09:00


 8/31/18 08:59   


 


 


 Magnesium Oxide


  (Mag-Ox Tab)  400 mg  DAILY


 PO  8/1/18 09:00


 8/31/18 08:59   


 


 


 Metoprolol


 Tartrate


  (Lopressor Tab)  50 mg  BID


 PO  7/31/18 21:00


 8/30/18 20:59   


 


 


 Simvastatin


  (Zocor Tab)  40 mg  HS


 PO  7/31/18 21:00


 8/30/18 20:59  7/31/18 20:51


40 MG


 


 Tamsulosin HCl


  (Flomax Cap)  0.4 mg  DAILY


 PO  8/1/18 09:00


 8/31/18 08:59   


 


 


 Oxycodone HCl


  (Roxicodone


 Immediate Rel Tab)  5 mg  Q6H  PRN


 PO  7/31/18 14:30


 8/14/18 14:29   


 


 


 Enoxaparin Sodium


  (Lovenox Inj)  40 mg  Q24H


 SC  7/31/18 21:00


 8/30/18 20:59  7/31/18 20:50


40 MG


 


 Acetaminophen


  (Tylenol Tab)  650 mg  Q4H  PRN


 PO  7/31/18 14:45


 8/30/18 14:44   


 


 


 Al Hydrox/Mg


 Hydrox/Simethicone


  (Maalox Max Susp)  15 ml  Q4H  PRN


 PO  7/31/18 14:45


 8/30/18 14:44   


 


 


 Magnesium


 Hydroxide


  (Milk Of


 Magnesia Susp)  30 ml  Q12H  PRN


 PO  7/31/18 14:45


 8/30/18 14:44   


 


 


 Zolpidem Tartrate


  (Ambien Tab)  5 mg  HSZ  PRN


 PO  7/31/18 14:45


 8/30/18 14:44   


 


 


 Ondansetron HCl


  (Zofran Inj)  4 mg  Q6H  PRN


 IV  7/31/18 14:45


 8/30/18 14:44   


 


 


 Nitroglycerin


  (Nitrostat Tab)  0.4 mg  UD  PRN


 SL  7/31/18 14:45


 8/30/18 14:44   


 


 


 Morphine Sulfate


  (MoRPHine


 SULFATE INJ)  2 mg  Q30M  PRN


 IV  7/31/18 14:45


 8/14/18 14:44   


 


 


 Polyethylene


  (Miralax Powder


 Packet)  17 gm  DAILY  PRN


 PO  7/31/18 14:45


 8/30/18 14:44   


 


 


 Insulin Aspart


  (novoLOG ASPART)  **SLIDING


 SCALE**


 **G...  ACHS


 SC  7/31/18 16:00


 8/30/18 15:59   


 


 


 Glucose


  (Glucose 40% Gel)  15-30


 GRAMS 15


 GRAMS...  UD  PRN


 PO  7/31/18 15:15


 8/30/18 15:14   


 


 


 Glucose


  (Glucose Chew


 Tab)  4-8


 Tablets 4


 Tabl...  UD  PRN


 PO  7/31/18 15:15


 8/30/18 15:14   


 


 


 Dextrose


  (Dextrose 50%


 50ML Syringe)  25-50ML


 25ML FOR


 ...  UD  PRN


 IV  7/31/18 15:15


 8/30/18 15:14   


 


 


 Glucagon


  (Glucagon Inj)  1 mg  UD  PRN


 IM  7/31/18 15:15


 8/30/18 15:14   


 


 


 Carbohydrates


  (Carbohydrates


 For Hypoglycemia)  15-30 GRAMS


 15 grams if


 BSG 54-69...  UD  PRN


 PO  7/31/18 15:15


 8/30/18 15:14   


 


 


 Sodium Chloride  1,000 ml @ 


 125 mls/hr  Q8H


 IV  7/31/18 15:15


 8/1/18 07:14  7/31/18 16:41


125 MLS/HR


 


 Miscellaneous


  (Iv Fluids


 Completed)  1 ea  PRN  PRN


 N/A  7/31/18 15:45


 7/31/19 15:44   


 











Physical Exam





Vital Signs Past 12 Hours








  Date Time  Temp Pulse Resp B/P (MAP) Pulse Ox O2 Delivery O2 Flow Rate FiO2


 


7/31/18 19:18 36.7 47 20 125/65 (85) 96 Room Air  


 


7/31/18 15:38 36.5 52 17 142/78 100 Room Air  


 


7/31/18 15:11  55 18 128/68 100   


 


7/31/18 13:53  47 17 133/71 100 Nasal Cannula 2.0 


 


7/31/18 13:04  59  106/99 100 Nasal Cannula 2.0 


 


7/31/18 12:44  52      


 


7/31/18 12:37     99 Nasal Cannula 2.0 


 


7/31/18 12:30  85      


 


7/31/18 12:15     99 Room Air  


 


7/31/18 12:15 36.8 54 17 134/76 99 Room Air  








Constitutional:  


   General Apperance:  heathly-appearing


   Level of Distress:  NAD


Psychiatric:  


   Mental Status:  active & alert


Head:  normocephalic


Eyes:  


   EOM:  EOMI


ENMT:  normal ENT inspection, hearing grossly normal


Neck:  supple, no masses


Lungs:  


   Respiratory effort:  no dyspnea, good air movement


   Auscultation:  breath sounds normal, no wheezing


Cardiovascular:  


   Heart Auscultation:  RRR, no murmurs, no rubs, no gallops, bradycardia


Peripheral Pulses:  


   Bruits:  none appreciated


Abdomen:  


   Bowel Sounds:  normal


   Inspection & Palpation:  soft, no tenderness, guarding & rebound, no masses


Musculoskeletal:  normal strength (5/5 throughout)


Extremities:  no edema


Neurologic:  


   Cranial Nerves:  grossly intact


   Sensation:  grossly intact





Data


Laboratory Results:





Last 24 Hours








Test


  7/31/18


12:24 7/31/18


12:30 7/31/18


16:15 7/31/18


16:30


 


White Blood Count 3.88 K/uL    


 


Red Blood Count 3.90 M/uL    


 


Hemoglobin 12.0 g/dL    


 


Hematocrit 35.0 %    


 


Mean Corpuscular Volume 89.7 fL    


 


Mean Corpuscular Hemoglobin 30.8 pg    


 


Mean Corpuscular Hemoglobin


Concent 34.3 g/dl 


  


  


  


 


 


Platelet Count 120 K/uL    


 


Mean Platelet Volume 10.9 fL    


 


Neutrophils (%) (Auto) 56.2 %    


 


Lymphocytes (%) (Auto) 30.7 %    


 


Monocytes (%) (Auto) 8.2 %    


 


Eosinophils (%) (Auto) 4.6 %    


 


Basophils (%) (Auto) 0.3 %    


 


Neutrophils # (Auto) 2.18 K/uL    


 


Lymphocytes # (Auto) 1.19 K/uL    


 


Monocytes # (Auto) 0.32 K/uL    


 


Eosinophils # (Auto) 0.18 K/uL    


 


Basophils # (Auto) 0.01 K/uL    


 


RDW Standard Deviation 41.8 fL    


 


RDW Coefficient of Variation 13.0 %    


 


Immature Granulocyte % (Auto) 0.0 %    


 


Immature Granulocyte # (Auto) 0.00 K/uL    


 


Prothrombin Time 11.9 SECONDS    


 


Prothromb Time International


Ratio 1.1 


  


  


  


 


 


Activated Partial


Thromboplast Time 21.8 SECONDS 


  


  


  


 


 


Partial Thromboplastin Ratio 0.8    


 


Sodium Level 140 mmol/L    


 


Potassium Level 4.1 mmol/L    


 


Chloride Level 107 mmol/L    


 


Carbon Dioxide Level 27 mmol/L    


 


Anion Gap 5.0 mmol/L    


 


Blood Urea Nitrogen 19 mg/dl    


 


Creatinine 1.47 mg/dl    


 


Est Creatinine Clear Calc


Drug Dose 54.3 ml/min 


  


  


  


 


 


Estimated GFR (


American) 56.8 


  


  


  


 


 


Estimated GFR (Non-


American 49.0 


  


  


  


 


 


BUN/Creatinine Ratio 13.0    


 


Random Glucose 173 mg/dl    


 


Calcium Level 9.8 mg/dl    


 


Magnesium Level 1.6 mg/dl    


 


Total Bilirubin 0.7 mg/dl    


 


Direct Bilirubin 0.2 mg/dl    


 


Aspartate Amino Transf


(AST/SGOT) 26 U/L 


  


  


  


 


 


Alanine Aminotransferase


(ALT/SGPT) 32 U/L 


  


  


  


 


 


Alkaline Phosphatase 37 U/L    


 


Troponin I < 0.015 ng/ml    


 


Pro-B-Type Natriuretic Peptide 83 pg/ml    


 


Total Protein 6.8 gm/dl    


 


Albumin 3.6 gm/dl    


 


Lipase 396 U/L    


 


Thyroid Stimulating Hormone


(TSH) 1.910 uIu/ml 


  


  


  


 


 


Bedside Glucose  177 mg/dl  147 mg/dl  


 


Urine Color    DK YELLOW 


 


Urine Appearance    CLEAR 


 


Urine pH    5.0 


 


Urine Specific Gravity    1.020 


 


Urine Protein    NEG 


 


Urine Glucose (UA)    NEG 


 


Urine Ketones    NEG 


 


Urine Occult Blood    NEG 


 


Urine Nitrite    NEG 


 


Urine Bilirubin    NEG 


 


Urine Urobilinogen    NEG 


 


Urine Leukocyte Esterase    NEG 


 


Test


  7/31/18


20:38 


  


  


 


 


Bedside Glucose 92 mg/dl    








Imaging: Chest x-ray is unremarkable





EKG: Sinus bradycardia, otherwise unremarkable





Telemetry reviewed: Sinus bradycardia, 113 beat run of nonsustained ventricular 

tachycardia at about 110 bpm in the emergency room.  None since.





Echocardiogram: An echocardiogram done February 7, 2018 shows normal left 

ventricular size and function with an ejection fraction of 55-60%.  There is 

moderate inferolateral hypokinesis.  This is felt to be unchanged from 

September 4, 2015.





Assessment & Plan


1.  Syncope: The specific cause of his syncope is not clear, however the 

symptoms are very suspicious for a cardiac arrhythmia.  This could be 

bradycardia or tachycardia, to my knowledge he does not have significant left 

ventricular dysfunction (as of February) but that does not exclude a 

ventricular arrhythmia but makes it less likely.  Transient bradycardia (either 

from sinus node dysfunction or AV block) also remains a possibility.  Now with 

2 episodes I think we need to try to identify a cause.  I would keep him on 

telemetry overnight, if nothing shows up we can consider stress testing in the 

morning.  We can also consider long-term monitoring.  





2.  Coronary artery disease: He has coronary disease and has had bypass surgery

, however he does not have symptoms to suggest angina.  On the other hand 

before his presentation with a myocardial infarction in 2000 he does not recall 

having angina.  I think we need to consider a stress test to exclude 

asymptomatic progression of coronary artery disease.  We can also get a measure 

of his ejection fraction at that time.  I agree with trending cardiac enzymes.





3.  Daytime sleepiness: Although this has been diagnosed as opiate use or some 

other abnormality in the he is quite bradycardic and it is possible it is 

related to his slow heart rate.  I think to be worth a trial without beta-

blockade although at the moment possible coronary disease and his nonsustained 

VT that may not be a good option for tonight.





Further recommendations depending on what happens on telemetry and initial 

evaluation.





Thank you for allowing me to participate in his care.

## 2018-07-31 NOTE — DIAGNOSTIC IMAGING REPORT
CHEST ONE VIEW PORTABLE



CLINICAL HISTORY: EVALUATE WEAKNESS dyspnea



COMPARISON STUDY:  2/6/2018



FINDINGS: Prior median sternotomy. Diaphragms are smooth. Lungs are clear. 



IMPRESSION:  No acute process. 











The above report was generated using voice recognition software.  It may contain

grammatical, syntax or spelling errors.









Electronically signed by:  Delbert Ruffin M.D.

7/31/2018 12:38 PM



Dictated Date/Time:  7/31/2018 12:38 PM

## 2018-07-31 NOTE — EMERGENCY ROOM VISIT NOTE
History


Report prepared by Song:  Sheyla Juarez


Under the Supervision of:  Dr. Lawson Krishnan D.O.


First contact with patient:  12:09


Stated Complaint:  SYNCOPE





History of Present Illness


The patient is a 68 year old male who presents to the Emergency Room with 

complaints of a syncopal episode happening shortly prior to arrival. The 

patient reports that he was at the pain management clinic for low back pain 

which is typical for him. He reports that his back pain has worsened over the 

last 4 days. He states that he takes 5 mg of Oxycodone for his pain and that he 

started taking this yesterday afternoon every 6-7 hours. The patient states 

that he was waiting to be seen in the pain management clinic when all of a 

sudden he felt light-headed, dizzy and was sweaty and nauseous. He denies 

having chest pain, shortness of breath, diarrhea, and vomiting. The patient 

reports that he started to fan himself with a magazine. The patient states that 

his wife was sitting next to him in the room but that the next thing he 

remembers is his wife running into the room from the hallway. The patient 

reports feeling better now. Per wife, the patient was staring at her but was 

not responding prior to his syncopal episode. The patient states that when he 

woke up that he knew where he was. The patient reports a history of a heart 

attack and a bypass surgery.





   Source of History:  patient, spouse/significant other


   Onset:  shortly prior to arrival


   Position:  other (generalized )


   Quality:  other (syncopal episode )


   Associated Symptoms:  + diaphoresis, + nausea, + back pain (chronic), + 

weakness (light-headed, dizzy), No chest pain, No SOB, No vomiting, No diarrhea





Review of Systems


See HPI for pertinent positives & negatives. A total of 10 systems reviewed and 

were otherwise negative.





Past Medical & Surgical


Medical Problems:


(1) Diabetes


(2) Flu-like symptoms


(3) Generalized weakness


(4) Heart attack


(5) Near syncope


(6) Non-sustained ventricular tachycardia


(7) Oral cancer


Surgical Problems:


(1) S/P triple vessel bypass








Family History





Cancer


Diabetes mellitus


FH: gallbladder disease


FH: lung disease


Heart disease


Hypertension


Kidney disease





Social History


Smoking Status:  Never Smoker


Alcohol Use:  none


Marital Status:  


Housing Status:  lives with family


Occupation Status:  employed





Current/Historical Medications


Scheduled


Aspirin (Ecotrin Regular Strength), 325 MG PO Q2D


Glyburide (Diabeta), 2.5 MG PO BID


Lisinopril (Zestril), 10 MG PO DAILY


Magnesium Oxide (Mag-Ox), 400 MG PO DAILY


Metformin Hcl (Glucophage), 500 MG PO BID


Metoprolol Succ (Toprol Xl) (Toprol-Xl), 50 MG PO BID


Simvastatin (Zocor), 40 MG PO DAILY


Tamsulosin Hcl (Flomax), 0.4 MG PO DAILY





Scheduled PRN


Oxycodone HCl (Oxycodone HCl), 5 MG PO Q6H PRN for Pain





Allergies


Coded Allergies:  


     No Known Allergies (Verified , 7/31/18)





Physical Exam


Vital Signs











  Date Time  Temp Pulse Resp B/P (MAP) Pulse Ox O2 Delivery O2 Flow Rate FiO2


 


7/31/18 13:53  47 17 133/71 100 Nasal Cannula 2.0 


 


7/31/18 13:04  59  106/99 100 Nasal Cannula 2.0 


 


7/31/18 12:44  52      


 


7/31/18 12:37     99 Nasal Cannula 2.0 


 


7/31/18 12:30  85      


 


7/31/18 12:15     99 Room Air  


 


7/31/18 12:15 36.8 54 17 134/76 99 Room Air  











Physical Exam


GENERAL: Sitting up in bed, alert, well appearing, well nourished, no distress, 

non-toxic 


EYE EXAM: normal conjunctiva. 


OROPHARYNX: no exudate, no erythema, lips, buccal mucosa, and tongue normal and 

mucous membranes are moist


NECK: supple, no nuchal rigidity, no adenopathy, non-tender


LUNGS: Clear to auscultation. Normal chest wall mechanics


HEART: no murmurs, S1 normal and S2 normal 


ABDOMEN: abdomen soft, non-tender, normo-active bowel sounds, no masses, no 

rebound or guarding. 


BACK: Back is symmetrical on inspection and there is no deformity, no midline 

tenderness, no CVA tenderness. 


SKIN: no rashes and no bruising 


UPPER EXTREMITIES: upper extremities are grossly normal. 


LOWER EXTREMITIES: No pitting edema. 


NEURO EXAM: Normal sensorium, cranial nerves II-XII grossly intact, normal 

speech,  no gross weakness of arms, no gross weakness of legs.





Medical Decision & Procedures


ER Provider


Diagnostic Interpretation:


Radiology results as stated below per my review and the radiologist's 

interpretation: 





CHEST ONE VIEW PORTABLE





CLINICAL HISTORY: EVALUATE WEAKNESS dyspnea





COMPARISON STUDY:  2/6/2018





FINDINGS: Prior median sternotomy. Diaphragms are smooth. Lungs are clear. 





IMPRESSION:  No acute process. 

















The above report was generated using voice recognition software.  It may contain


grammatical, syntax or spelling errors.














Electronically signed by:  Delbert Ruffin M.D.


7/31/2018 12:38 PM





Dictated Date/Time:  7/31/2018 12:38 PM





Laboratory Results


7/31/18 12:24








Red Blood Count 3.90, Mean Corpuscular Volume 89.7, Mean Corpuscular Hemoglobin 

30.8, Mean Corpuscular Hemoglobin Concent 34.3, Mean Platelet Volume 10.9, 

Neutrophils (%) (Auto) 56.2, Lymphocytes (%) (Auto) 30.7, Monocytes (%) (Auto) 

8.2, Eosinophils (%) (Auto) 4.6, Basophils (%) (Auto) 0.3, Neutrophils # (Auto) 

2.18, Lymphocytes # (Auto) 1.19, Monocytes # (Auto) 0.32, Eosinophils # (Auto) 

0.18, Basophils # (Auto) 0.01





7/31/18 12:24

















Test


  7/31/18


12:24


 


White Blood Count


  3.88 K/uL


(4.8-10.8)


 


Red Blood Count


  3.90 M/uL


(4.7-6.1)


 


Hemoglobin


  12.0 g/dL


(14.0-18.0)


 


Hematocrit 35.0 % (42-52) 


 


Mean Corpuscular Volume


  89.7 fL


()


 


Mean Corpuscular Hemoglobin


  30.8 pg


(25-34)


 


Mean Corpuscular Hemoglobin


Concent 34.3 g/dl


(32-36)


 


Platelet Count


  120 K/uL


(130-400)


 


Mean Platelet Volume


  10.9 fL


(7.4-10.4)


 


Neutrophils (%) (Auto) 56.2 % 


 


Lymphocytes (%) (Auto) 30.7 % 


 


Monocytes (%) (Auto) 8.2 % 


 


Eosinophils (%) (Auto) 4.6 % 


 


Basophils (%) (Auto) 0.3 % 


 


Neutrophils # (Auto)


  2.18 K/uL


(1.4-6.5)


 


Lymphocytes # (Auto)


  1.19 K/uL


(1.2-3.4)


 


Monocytes # (Auto)


  0.32 K/uL


(0.11-0.59)


 


Eosinophils # (Auto)


  0.18 K/uL


(0-0.5)


 


Basophils # (Auto)


  0.01 K/uL


(0-0.2)


 


RDW Standard Deviation


  41.8 fL


(36.4-46.3)


 


RDW Coefficient of Variation


  13.0 %


(11.5-14.5)


 


Immature Granulocyte % (Auto) 0.0 % 


 


Immature Granulocyte # (Auto)


  0.00 K/uL


(0.00-0.02)


 


Prothrombin Time


  11.9 SECONDS


(9.0-12.0)


 


Prothromb Time International


Ratio 1.1 (0.9-1.1) 


 


 


Activated Partial


Thromboplast Time 21.8 SECONDS


(21.0-31.0)


 


Partial Thromboplastin Ratio 0.8 


 


Anion Gap


  5.0 mmol/L


(3-11)


 


Est Creatinine Clear Calc


Drug Dose 54.3 ml/min 


 


 


Estimated GFR (


American) 56.8 


 


 


Estimated GFR (Non-


American 49.0 


 


 


BUN/Creatinine Ratio 13.0 (10-20) 


 


Calcium Level


  9.8 mg/dl


(8.5-10.1)


 


Magnesium Level


  1.6 mg/dl


(1.8-2.4)


 


Total Bilirubin


  0.7 mg/dl


(0.2-1)


 


Direct Bilirubin


  0.2 mg/dl


(0-0.2)


 


Aspartate Amino Transf


(AST/SGOT) 26 U/L (15-37) 


 


 


Alanine Aminotransferase


(ALT/SGPT) 32 U/L (12-78) 


 


 


Alkaline Phosphatase


  37 U/L


()


 


Troponin I


  < 0.015 ng/ml


(0-0.045)


 


Pro-B-Type Natriuretic Peptide


  83 pg/ml


(0-900)


 


Total Protein


  6.8 gm/dl


(6.4-8.2)


 


Albumin


  3.6 gm/dl


(3.4-5.0)


 


Lipase


  396 U/L


()


 


Thyroid Stimulating Hormone


(TSH) 1.910 uIu/ml


(0.300-4.500)





Laboratory results per my review.





Medications Administered











 Medications


  (Trade)  Dose


 Ordered  Sig/Ney


 Route  Start Time


 Stop Time Status Last Admin


Dose Admin


 


 Sodium Chloride  1,000 ml @ 


 999 mls/hr  Q1H1M STAT


 IV  7/31/18 12:17


 7/31/18 13:17 DC 7/31/18 13:04


999 MLS/HR











ECG Per My Interpretation


Indication:  syncope


Rate (beats per minute):  51


Rhythm:  sinus bradycardia


Findings:  other (left axis deviation, no PVCs, normal intervals )





ED Course


ED COURSE: 


Vital signs were reviewed and showed bradycardia. 


The patients medical record was reviewed


The above diagnostic studies were performed and reviewed.


ED treatments and interventions as stated above. 





1210: The patient was evaluated in room A11B. A complete history and physical 

examination was performed.





1217: Ordered Sodium Chloride 1000 ml @ 999 mls/hr IV.





1225: I reviewed the patient's case with Dr. Cedeño-Cardiology who said to 

hold off on any amiodarone because he feels that the patient's rate would not 

cause him to pass out. 





1330: I updated the patient. 





1420: Upon reevaluation, the patient is resting. I discussed the findings and 

the treatment plan with the patient.  He expresses agreement and understanding.

  [facility]  He will be 

evaluated for further management.





Medical Decision


Differential diagnosis includes etiologies such as vasovagal event, infection, 

hypoglycemia, electrolyte abnormalities, cardiac sources, intracerebral event, 

toxicologic, neurologic, as well as others were entertained.





Patient is a 68-year-old male who presents the ER for syncopal episodes at pain 

management clinic.  He notes he became very nauseous and diaphoretic and passed 

out.  He has no other complaints at this time.  He notes he was having lower 

back pain which is chronic for him and has been slightly worse over the past 5 

days.  CBC shows a mild leukopenia.  Creatinine 1.4.  BSG was elevated.  LFTs 

bilirubin and troponin were negative.  Lipase was 400.  TSH 1.9.  On my initial 

evaluation at bedside he was on the monitor and went into V. tach for a total 

of 15 beats.  I reviewed this with Dr. Cedeño immediately following the event 

as he was in the ER.  As the patient broke on his own he recommended holding on 

any antiarrhythmics.  Patient remained on pads hooked up to code cart while in 

the ER.  Patient was admitted for a further workup of syncope which I favor was 

secondary to V. tach.





Medication Reconcilliation


Current Medication List:  was personally reviewed by me





Blood Pressure Screening


Patient's blood pressure:  Normal blood pressure





Consults


Time Called:  1225


Consulting Physician:  Dr. Cedeño- Cardiology


Returned Call:  1225


I reviewed the patient's case with Dr. Cedeño-Cardiology who said to hold off 

on any amiodarone because he feels that the patient's rate would not cause him 

to pass out.


Additional Consults:  


   Time Called:  1410


   Consulted Physician:  Dr. Schroeder- [facility]


   Returned Call:  1420


Additional Comments:


I reviewed the patient's case with Dr. Schroeder.  He will evaluate the patient for 

further management.





Impression





 Primary Impression:  


 Non-sustained ventricular tachycardia


 Additional Impressions:  


 S/P triple vessel bypass


 Syncope





Scribe Attestation


The scribe's documentation has been prepared under my direction and personally 

reviewed by me in its entirety. I confirm that the note above accurately 

reflects all work, treatment, procedures, and medical decision making performed 

by me.





Departure Information


Dispostion


Being Evaluated By Hospitalist





Referrals


Easton Harris M.D. (PCP)





Problem Qualifiers








 Additional Impressions:  


 Syncope


 Syncope type:  unspecified  Qualified Codes:  R55 - Syncope and collapse

## 2018-07-31 NOTE — HISTORY AND PHYSICAL
History & Physical


Date & Time of Service:


Jul 31, 2018 at 14:37


Chief Complaint:


Syncope


Primary Care Physician:


Easton Harris M.D.


History of Present Illness


Source:  patient, hospital records, other


67 y/o M Hx CAD, HTN, HLD, DM II, BPH, chronic lower back pain.  Presents 

following a syncopal episode.  He was at a pain clinic with his wife due to 

recent worsening of his back pain when he became exceedingly lightheaded and 

diaphoretic.  His wife reports that he was then staring and unresponsive for a 

brief period before losing consciousness.  His LOC lasted less than 1 minute 

and he was fully oriented upon waking.  The pt denies any CP, N/V or dysuria.  


While in the ER, the pt had a run of 14 beats of VT. He was asymptomatic during 

this episode.  It is noted that he was prescribed a low dose of Oxycodone a few 

days prior and reports that he had taken approximately 4 doses of 5mg at 6 hour 

intervals.  He was previously prescribed narcotics for a 10 year period.  He 

had discontinued regular use 12/17 following a nerve ablation procedure.  


Initial labs are notable for mild, ARIEL, hypomagnesemia and mild Lipase 

elevation.  





The pt presented with dizziness and light-headedness 02/2018.  This was at the 

time attributed to dehydration.  He had an extensive workup including an echo 

which was normal aside form mild LVH.





Past Medical/Surgical History


1) CAD - MI and 3V CABG 2000


2) DM II


3) HTN


4) HLD


5) BPH


6) Chronic lower back pain - previous narcotic dependence





Family History





Cancer


Diabetes mellitus


FH: gallbladder disease


FH: lung disease


Heart disease


Hypertension


Kidney disease





Social History


Employed as a State controller.  No history of smoking, rarely drinks ETOH.


Smoking Status:  Never Smoker


Alcohol Use:  socially


Marital Status:  


Housing status:  lives with family


Occupational Status:  employed





Immunizations


History of Tetanus Vaccine?:  Unknown


History of Pneumococcal:  No


History of Hepatitis B Vaccine:  No





Allergies


Coded Allergies:  


     No Known Allergies (Verified , 7/31/18)





Home Medications


Scheduled


Aspirin (Ecotrin Regular Strength), 325 MG PO Q2D


Glyburide (Diabeta), 2.5 MG PO BID


Lisinopril (Zestril), 10 MG PO DAILY


Magnesium Oxide (Mag-Ox), 400 MG PO DAILY


Metformin Hcl (Glucophage), 500 MG PO BID


Metoprolol Succ (Toprol Xl) (Toprol-Xl), 50 MG PO BID


Simvastatin (Zocor), 40 MG PO DAILY


Tamsulosin Hcl (Flomax), 0.4 MG PO DAILY





Scheduled PRN


Oxycodone HCl (Oxycodone HCl), 5 MG PO Q6H PRN for Pain





Review of Systems


Constitutional:  No fever, No chills, No sweats


Eyes:  No worsening of vision


ENT:  No hearing loss, No unusual epistaxis, No nasal symptoms


Respiratory:  No cough, No sputum, No wheezing


Cardiovascular:  No chest pain, No orthopnea, No PND


Abdomen:  No pain, No nausea, No vomiting


Musculoskeletal:  + joint pain (Chronic back pain as above)


Genitourinary - Male:  No hematuria, No dysuria


Neurologic:  + problem reported (Syncope as above), No memory loss, No paralysis


Psychiatric:  No depression symptoms


Endocrine:  No fatigue


Hematologic / Lymphatic:  No abnormal bleeding/bruising


Integumentary:  No rash


Allergic / Immunologic:  No environmental allergies





Physical Exam


Vital Signs











  Date Time  Temp Pulse Resp B/P (MAP) Pulse Ox O2 Delivery O2 Flow Rate FiO2


 


7/31/18 13:53  47 17 133/71 100 Nasal Cannula 2.0 


 


7/31/18 13:04  59  106/99 100 Nasal Cannula 2.0 


 


7/31/18 12:44  52      


 


7/31/18 12:37     99 Nasal Cannula 2.0 


 


7/31/18 12:30  85      


 


7/31/18 12:15     99 Room Air  


 


7/31/18 12:15 36.8 54 17 134/76 99 Room Air  








General Appearance:  WD/WN, no apparent distress


Head:  normocephalic


Eyes:  normal inspection


ENT:  normal ENT inspection, pharynx normal


Neck:  supple, no JVD


Respiratory/Chest:  chest non-tender, lungs clear, normal breath sounds


Cardiovascular:  regular rate, rhythm, no edema, no gallop, no JVD


Abdomen/GI:  normal bowel sounds, non tender, soft


Back:  normal inspection, no CVA tenderness


Extremities/Musculoskelatal:  normal inspection, no calf tenderness, normal 

capillary refill


Neurologic/Psych:  CNs II-XII nml as tested, no motor/sensory deficits, alert, 

oriented x 3


Skin:  normal color





Diagnostics


Laboratory Results





Results Past 24 Hours








Test


  7/31/18


12:24 7/31/18


12:30 Range/Units


 


 


White Blood Count 3.88  4.8-10.8  K/uL


 


Red Blood Count 3.90  4.7-6.1  M/uL


 


Hemoglobin 12.0  14.0-18.0  g/dL


 


Hematocrit 35.0  42-52  %


 


Mean Corpuscular Volume 89.7    fL


 


Mean Corpuscular Hemoglobin 30.8  25-34  pg


 


Mean Corpuscular Hemoglobin


Concent 34.3


  


  32-36  g/dl


 


 


Platelet Count 120  130-400  K/uL


 


Mean Platelet Volume 10.9  7.4-10.4  fL


 


Neutrophils (%) (Auto) 56.2   %


 


Lymphocytes (%) (Auto) 30.7   %


 


Monocytes (%) (Auto) 8.2   %


 


Eosinophils (%) (Auto) 4.6   %


 


Basophils (%) (Auto) 0.3   %


 


Neutrophils # (Auto) 2.18  1.4-6.5  K/uL


 


Lymphocytes # (Auto) 1.19  1.2-3.4  K/uL


 


Monocytes # (Auto) 0.32  0.11-0.59  K/uL


 


Eosinophils # (Auto) 0.18  0-0.5  K/uL


 


Basophils # (Auto) 0.01  0-0.2  K/uL


 


RDW Standard Deviation 41.8  36.4-46.3  fL


 


RDW Coefficient of Variation 13.0  11.5-14.5  %


 


Immature Granulocyte % (Auto) 0.0   %


 


Immature Granulocyte # (Auto) 0.00  0.00-0.02  K/uL


 


Prothrombin Time


  11.9


  


  9.0-12.0


SECONDS


 


Prothromb Time International


Ratio 1.1


  


  0.9-1.1  


 


 


Activated Partial


Thromboplast Time 21.8


  


  21.0-31.0


SECONDS


 


Partial Thromboplastin Ratio 0.8   


 


Sodium Level 140  136-145  mmol/L


 


Potassium Level 4.1  3.5-5.1  mmol/L


 


Chloride Level 107    mmol/L


 


Carbon Dioxide Level 27  21-32  mmol/L


 


Anion Gap 5.0  3-11  mmol/L


 


Blood Urea Nitrogen 19  7-18  mg/dl


 


Creatinine


  1.47


  


  0.60-1.40


mg/dl


 


Est Creatinine Clear Calc


Drug Dose 54.3


  


   ml/min


 


 


Estimated GFR (


American) 56.8


  


   


 


 


Estimated GFR (Non-


American 49.0


  


   


 


 


BUN/Creatinine Ratio 13.0  10-20  


 


Random Glucose 173  70-99  mg/dl


 


Calcium Level 9.8  8.5-10.1  mg/dl


 


Magnesium Level 1.6  1.8-2.4  mg/dl


 


Total Bilirubin 0.7  0.2-1  mg/dl


 


Direct Bilirubin 0.2  0-0.2  mg/dl


 


Aspartate Amino Transf


(AST/SGOT) 26


  


  15-37  U/L


 


 


Alanine Aminotransferase


(ALT/SGPT) 32


  


  12-78  U/L


 


 


Alkaline Phosphatase 37    U/L


 


Troponin I < 0.015  0-0.045  ng/ml


 


Pro-B-Type Natriuretic Peptide 83  0-900  pg/ml


 


Total Protein 6.8  6.4-8.2  gm/dl


 


Albumin 3.6  3.4-5.0  gm/dl


 


Lipase 396    U/L


 


Thyroid Stimulating Hormone


(TSH) 1.910


  


  0.300-4.500


uIu/ml


 


Bedside Glucose  177 70-99  mg/dl








Normal EKG





Impression


Assessment and Plan


67 y/o M Hx CAD, HTN, HLD, DM II, BPH, chronic lower back pain.  Presents 

following a syncopal episode.  He was at a pain clinic with his wife due to 

recent worsening of his back pain when he became exceedingly lightheaded and 

diaphoretic.  His wife reports that he was then staring and unresponsive for a 

brief period before losing consciousness.  His LOC lasted less than 1 minute 

and he was fully oriented upon waking.  The pt denies any CP, N/V or dysuria.  


While in the ER, the pt had a run of 14 beats of VT. He was asymptomatic during 

this episode.  It is noted that he was prescribed a low dose of Oxycodone a few 

days prior and reports that he had taken approximately 4 doses of 5mg at 6 hour 

intervals.  He was previously prescribed narcotics for a 10 year period.  He 

had discontinued regular use 12/17 following a nerve ablation procedure.  


Initial labs are notable for mild, ARIEL, hypomagnesemia and mild Lipase 

elevation. 





The pt presented with dizziness and light-headedness 02/2018.  This was at the 

time attributed to dehydration.  He had an extensive workup including an echo 

which was normal aside form mild LVH.  





1) Syncope - asymptomatic NSVT in the ER - the pt will be monitored overnight.  

He will be evaluated by cardiology.  As he had an extensive workup this past Feb

, we will defer to cardiology for additional testing.  He may benefit from 

ambulatory monitoring upon DC if no etiology is identified.  


His previous near-syncopal episode was attributed to dehydration.  He was not 

clear how he had become dehydrated previously.  It is noted that mild ARIEL is 

present both today and when he was admitted in February.  





2) Mild ARIEL, hypomagnesemia - NS provided, Mg repleted - labs will be trended.  

He does not presently have any GI symptoms so that lipase will not be trended.  





3) CAD - no evidence of ACS - cont ASA, Statin, B blocker





4) DM II - placed on a SS





5) HTN, HPL - cont Lisinopril, Toprol, Zocor











Full code - Lovenox prophylaxis - total tie for this admit including review of 

labs, meds imaging, records - discussion with pt and ER attending - 33 min





Resuscitation Status








VTE Prophylaxis


Will order VTE Prophylaxis:  Yes

## 2018-08-01 VITALS
HEART RATE: 46 BPM | SYSTOLIC BLOOD PRESSURE: 140 MMHG | OXYGEN SATURATION: 97 % | TEMPERATURE: 97.7 F | DIASTOLIC BLOOD PRESSURE: 74 MMHG

## 2018-08-01 VITALS
OXYGEN SATURATION: 97 % | SYSTOLIC BLOOD PRESSURE: 140 MMHG | TEMPERATURE: 97.7 F | DIASTOLIC BLOOD PRESSURE: 74 MMHG | HEART RATE: 46 BPM

## 2018-08-01 VITALS
TEMPERATURE: 98.06 F | HEART RATE: 57 BPM | SYSTOLIC BLOOD PRESSURE: 119 MMHG | OXYGEN SATURATION: 97 % | DIASTOLIC BLOOD PRESSURE: 69 MMHG

## 2018-08-01 VITALS
DIASTOLIC BLOOD PRESSURE: 71 MMHG | TEMPERATURE: 98.06 F | SYSTOLIC BLOOD PRESSURE: 132 MMHG | OXYGEN SATURATION: 97 % | HEART RATE: 54 BPM

## 2018-08-01 VITALS
DIASTOLIC BLOOD PRESSURE: 76 MMHG | HEART RATE: 54 BPM | SYSTOLIC BLOOD PRESSURE: 152 MMHG | OXYGEN SATURATION: 95 % | TEMPERATURE: 98.42 F

## 2018-08-01 VITALS
HEART RATE: 56 BPM | OXYGEN SATURATION: 95 % | TEMPERATURE: 98.6 F | DIASTOLIC BLOOD PRESSURE: 77 MMHG | SYSTOLIC BLOOD PRESSURE: 182 MMHG

## 2018-08-01 LAB
BUN SERPL-MCNC: 17 MG/DL (ref 7–18)
CALCIUM SERPL-MCNC: 9.1 MG/DL (ref 8.5–10.1)
CO2 SERPL-SCNC: 24 MMOL/L (ref 21–32)
CREAT SERPL-MCNC: 0.99 MG/DL (ref 0.6–1.4)
GLUCOSE SERPL-MCNC: 65 MG/DL (ref 70–99)
POTASSIUM SERPL-SCNC: 4.2 MMOL/L (ref 3.5–5.1)
SODIUM SERPL-SCNC: 143 MMOL/L (ref 136–145)

## 2018-08-01 RX ADMIN — MAGNESIUM SULFATE IN DEXTROSE SCH MLS/HR: 10 INJECTION, SOLUTION INTRAVENOUS at 08:48

## 2018-08-01 RX ADMIN — SODIUM CHLORIDE SCH MLS/HR: 900 INJECTION, SOLUTION INTRAVENOUS at 00:50

## 2018-08-01 RX ADMIN — INSULIN ASPART SCH UNITS: 100 INJECTION, SOLUTION INTRAVENOUS; SUBCUTANEOUS at 07:34

## 2018-08-01 RX ADMIN — INSULIN ASPART SCH UNITS: 100 INJECTION, SOLUTION INTRAVENOUS; SUBCUTANEOUS at 12:02

## 2018-08-01 RX ADMIN — METOPROLOL TARTRATE SCH MG: 50 TABLET, FILM COATED ORAL at 07:34

## 2018-08-01 RX ADMIN — MAGNESIUM SULFATE IN DEXTROSE SCH MLS/HR: 10 INJECTION, SOLUTION INTRAVENOUS at 10:14

## 2018-08-01 NOTE — DISCHARGE SUMMARY
Discharge Summary


Date of Service


Aug 1, 2018.





Discharge Summary


Admission Date:


Jul 31, 2018 at 14:35


Discharge Date:  Aug 1, 2018


Discharge Disposition:  Home


Immunizations:  


   History of Tetanus Vaccine?:  Unknown


   History of Pneumococcal:  No


   History of Hepatitis B Vaccine:  No





Medication Reconciliation


Continued Medications:  


Aspirin (Ecotrin Regular Strength) 325 Mg Tab


325 MG PO Q2D





Glyburide (Diabeta) 2.5 Mg Tab


2.5 MG PO BID





Lisinopril (Zestril) 5 Mg Tab


10 MG PO DAILY, TAB





Magnesium Oxide (Mag-Ox) 400 Mg Tab


400 MG PO DAILY, TAB





Metformin Hcl (Glucophage) 500 Mg Tab


500 MG PO BID, TAB





Oxycodone HCl (Oxycodone HCl) 5 Mg Tab


5 MG PO Q6H PRN for Pain





Simvastatin (Zocor) 40 Mg Tab


40 MG PO DAILY





Tamsulosin Hcl (Flomax) 0.4 Mg Cap


0.4 MG PO DAILY, CAP





 


Discontinued Medications:  


Metoprolol Tartrate (Lopressor) (Lopressor) 50 Mg Tab


1 TAB PO BID for 90 Days, #180 TAB 3 Refills











Hospital Course


Total Time Spent:  Greater than 30 minutes


This includes examination of the patient, discharge planning, medication 

reconciliation, and communication with other providers.





Discharge Instructions


Please refer to the electronic Patient Visit Report (Discharge Instructions) 

for additional information.





Additional Copies To


Suleman Cedeño M.D.

## 2018-08-01 NOTE — CARDIOLOGY FOLLOW-UP
Subjective


Date of Service:


Aug 1, 2018.


Pt evaluation today including:  conversation w/ patient, conversation w/ family

, physical exam, lab review, review of studies, review of inpatient medication 

list


History of Present Illness


This is a very pleasant 68-year-old gentleman who has a history of coronary 

disease including myocardial infarction and bypass surgery in 2000 at [facility] (per patient, I do not see records).  He has a history of 

sleepiness for which he has been evaluated and per the patient was associated 

with opioid use.  He has long-standing asymptomatic bradycardia.  More recently 

he has experienced several episodes of presyncope and syncope.  One presyncopal 

event occurred February of this year, the syncopal event occurred the day of 

admission.  The episodes are brief and have been observed, he feels they last 

seconds but other observers indicate that they are longer (his wife feels the 

episode was less than 1 minute in duration).  He is not aware of palpitations, 

chest discomfort or shortness of breath around these times.  He has noted no 

change in his exercise ability and has no exertional chest discomfort and has 

never used nitroglycerin.





While in the emergency room he was observed to have a brief run of nonsustained 

ventricular tachycardia at a slow heart rate, around 110 bpm for 13 beats.  

This was asymptomatic.  His heart rate does run slow and there seems to be some 

confusion about his beta-blocker dose.





Today he feels well, he has had no further lightheadedness, dizziness, 

presyncope or syncope.  He denies palpitations.  His wife is present at his 

bedside today and notes that he has continued to have some element of 

sleepiness although it is improved from when it was evaluated in the past.





Social History


Smoking Status:  Never Smoker


History of Alcohol Use:  Yes (socially)





Review of Systems


Respiratory:  No shortness of breath


Cardiac:  + see HPI, No chest pain





Medications


Cardiovascular:











Item Value  Date Time


 


Aspirin 325 mg 8/1/18 0900





 (Ecotrin Tab) Q2D@0900/PO 8/1/18 0735


 


Lisinopril 10 mg 8/1/18 0900





 (Zestril Tab) DAILY/PO 8/1/18 0736


 


Magnesium Oxide 400 mg 8/1/18 0900





 (Mag-Ox Tab) DAILY/PO 8/1/18 0735


 


Metoprolol 50 mg 7/31/18 2100





Tartrate BID/PO 





 (Lopressor Tab)  


 


Simvastatin 40 mg 7/31/18 2100





 (Zocor Tab) HS/PO 7/31/18 2051


 


Enoxaparin Sodium 40 mg 7/31/18 2100





 (Lovenox Inj) Q24H/SC 7/31/18 2050











Objective





Vital Signs Past 12 Hours








  Date Time  Temp Pulse Resp B/P (MAP) Pulse Ox O2 Delivery O2 Flow Rate FiO2


 


8/1/18 07:06 36.9 47 17 156/80 (105) 95 Room Air  





  54  126/79 (95)    





  54  152/76 (101)    


 


8/1/18 03:49 36.7 54 17 132/71 (91) 97 Room Air  


 


8/1/18 00:00 37.0 53 18 169/83 (111) 95 Room Air  





  59  182/94 (123)    





  56  184/77 (112)    


 


7/31/18 23:59      Room Air  








Last Recorded Weight-Kilograms:  86.800


Physical Exam


Constitutional:  


   General Apperance:  heathly-appearing


   Level of Distress:  NAD


Lungs:  


   Auscultation:  breath sounds normal


Cardiovascular:  


   Heart Auscultation:  RRR, no murmurs, bradycardia


Extremities:  no edema





Data


Laboratory Results:





Last 24 Hours








Test


  7/31/18


12:24 7/31/18


12:30 7/31/18


16:15 7/31/18


16:30


 


White Blood Count 3.88 K/uL    


 


Red Blood Count 3.90 M/uL    


 


Hemoglobin 12.0 g/dL    


 


Hematocrit 35.0 %    


 


Mean Corpuscular Volume 89.7 fL    


 


Mean Corpuscular Hemoglobin 30.8 pg    


 


Mean Corpuscular Hemoglobin


Concent 34.3 g/dl 


  


  


  


 


 


Platelet Count 120 K/uL    


 


Mean Platelet Volume 10.9 fL    


 


Neutrophils (%) (Auto) 56.2 %    


 


Lymphocytes (%) (Auto) 30.7 %    


 


Monocytes (%) (Auto) 8.2 %    


 


Eosinophils (%) (Auto) 4.6 %    


 


Basophils (%) (Auto) 0.3 %    


 


Neutrophils # (Auto) 2.18 K/uL    


 


Lymphocytes # (Auto) 1.19 K/uL    


 


Monocytes # (Auto) 0.32 K/uL    


 


Eosinophils # (Auto) 0.18 K/uL    


 


Basophils # (Auto) 0.01 K/uL    


 


RDW Standard Deviation 41.8 fL    


 


RDW Coefficient of Variation 13.0 %    


 


Immature Granulocyte % (Auto) 0.0 %    


 


Immature Granulocyte # (Auto) 0.00 K/uL    


 


Prothrombin Time 11.9 SECONDS    


 


Prothromb Time International


Ratio 1.1 


  


  


  


 


 


Activated Partial


Thromboplast Time 21.8 SECONDS 


  


  


  


 


 


Partial Thromboplastin Ratio 0.8    


 


Sodium Level 140 mmol/L    


 


Potassium Level 4.1 mmol/L    


 


Chloride Level 107 mmol/L    


 


Carbon Dioxide Level 27 mmol/L    


 


Anion Gap 5.0 mmol/L    


 


Blood Urea Nitrogen 19 mg/dl    


 


Creatinine 1.47 mg/dl    


 


Est Creatinine Clear Calc


Drug Dose 54.3 ml/min 


  


  


  


 


 


Estimated GFR (


American) 56.8 


  


  


  


 


 


Estimated GFR (Non-


American 49.0 


  


  


  


 


 


BUN/Creatinine Ratio 13.0    


 


Random Glucose 173 mg/dl    


 


Calcium Level 9.8 mg/dl    


 


Magnesium Level 1.6 mg/dl    


 


Total Bilirubin 0.7 mg/dl    


 


Direct Bilirubin 0.2 mg/dl    


 


Aspartate Amino Transf


(AST/SGOT) 26 U/L 


  


  


  


 


 


Alanine Aminotransferase


(ALT/SGPT) 32 U/L 


  


  


  


 


 


Alkaline Phosphatase 37 U/L    


 


Troponin I < 0.015 ng/ml    


 


Pro-B-Type Natriuretic Peptide 83 pg/ml    


 


Total Protein 6.8 gm/dl    


 


Albumin 3.6 gm/dl    


 


Lipase 396 U/L    


 


Thyroid Stimulating Hormone


(TSH) 1.910 uIu/ml 


  


  


  


 


 


Bedside Glucose  177 mg/dl  147 mg/dl  


 


Urine Color    DK YELLOW 


 


Urine Appearance    CLEAR 


 


Urine pH    5.0 


 


Urine Specific Gravity    1.020 


 


Urine Protein    NEG 


 


Urine Glucose (UA)    NEG 


 


Urine Ketones    NEG 


 


Urine Occult Blood    NEG 


 


Urine Nitrite    NEG 


 


Urine Bilirubin    NEG 


 


Urine Urobilinogen    NEG 


 


Urine Leukocyte Esterase    NEG 


 


Test


  7/31/18


20:38 8/1/18


05:13 8/1/18


07:24 


 


 


Bedside Glucose 92 mg/dl   80 mg/dl  


 


Sodium Level  143 mmol/L   


 


Potassium Level  4.2 mmol/L   


 


Chloride Level  112 mmol/L   


 


Carbon Dioxide Level  24 mmol/L   


 


Anion Gap  7.0 mmol/L   


 


Blood Urea Nitrogen  17 mg/dl   


 


Creatinine  0.99 mg/dl   


 


Est Creatinine Clear Calc


Drug Dose 


  80.6 ml/min 


  


  


 


 


Estimated GFR (


American) 


  91.6 


  


  


 


 


Estimated GFR (Non-


American 


  79.0 


  


  


 


 


BUN/Creatinine Ratio  17.1   


 


Random Glucose  65 mg/dl   


 


Calcium Level  9.1 mg/dl   


 


Magnesium Level  1.6 mg/dl   








Telemetry reviewed: Sinus bradycardia predominantly, heart rates in the 40s at 

times.  No further VT overnight.





Assessment and Plan


1.  Syncope: The specific cause of his syncope is not clear, however the 

symptoms are very suspicious for a cardiac arrhythmia.  This could be 

bradycardia or tachycardia, to my knowledge he does not have significant left 

ventricular dysfunction (as of February) but that does not exclude a 

ventricular arrhythmia but makes it less likely.  Transient bradycardia (either 

from sinus node dysfunction or AV block) also remains a possibility.  Now with 

2 episodes I think we need to try to identify a cause.  There is been no 

arrhythmia identified on telemetry monitoring, other than bradycardia.  I will 

plan stress testing today.  We can also consider long-term monitoring.  





2.  Coronary artery disease: He has coronary disease and has had bypass surgery

, however he does not have symptoms to suggest angina.  On the other hand 

before his presentation with a myocardial infarction in 2000 he does not recall 

having angina until the event itself.  I think we need to consider a stress 

test to exclude asymptomatic progression of coronary artery disease.  We can 

also get a measure of his ejection fraction at that time.  





3.  Daytime sleepiness: Although this has been diagnosed as opiate use or some 

other abnormality in the past he is quite bradycardic and it is possible it is 

related to his slow heart rate.  I think to be worth a trial without beta-

blockade, his beta-blockade was held this morning due to bradycardia.





Further recommendations depending on his stress test.





Thank you for allowing me to participate in his care.

## 2018-08-01 NOTE — DISCHARGE INSTRUCTIONS
Discharge Instructions


Date of Service


Aug 1, 2018.





Admission


Reason for Admission:  Syncope,Non-Sustained Ventricular Tachycardia





Discharge


Discharge Diagnosis / Problem:  Syncope,Non-Sustained Ventricular Tachycardia, 

bradycardia





Discharge Goals


Goal(s):  Improve disease control, Diagnostic testing, Therapeutic intervention





Activity Recommendations


Activity Limitations:  as noted below


Exercise/Sports Limitations:  gradually increase as tolerated


Shower/Bathe:  no limitations


Driving or Machine Use:  No driving until cleared by your cardiologist





.





Instructions / Follow-Up


Instructions / Follow-Up


You were admitted for syncope which means passing out.  This may be due to a 

low heart rate.  Do not take your metoprolol after discharge from the hospital 

as this lowers your heart rate.  You had a stress test which was normal.  You 

had a loop recorder implanted which will be able to keep track of every beat of 

your heart to help determine if you have a cardiac arrhythmia.  Please follow-

up with cardiologist as directed





Please follow-up with your PCP and pain management within 1-2 weeks.





Current Hospital Diet


Patient's current hospital diet: AHA Diet (Heart Healthy), Diabetes Type 2 Diet





Discharge Diet


Recommended Diet:  AHA Diet (Heart Healthy), Diabetes Type 2 Diet





Procedures


Procedures Performed:  


Stress echocardiogram


Chest x-ray


Loop recorder implantation





Pending Studies


Studies pending at discharge:  no





Laboratory Results





Hemoglobin A1c








Test


  6/28/18


08:40 Range/Units


 


 


Estimated Average Glucose 123   mg/dl


 


Hemoglobin A1c 5.9 H 4.5-5.6  %








Lipid Panel








Test


  6/28/18


08:40 Range/Units


 


 


Triglycerides Level 161 H 0-150  mg/dl


 


Cholesterol Level 117  0-200  mg/dl


 


HDL Cholesterol 35   mg/dl


 


LDL Cholesterol Direct 68   mg/dl


 


Cholesterol/HDL Ratio 3.3   


 


LDL Cholesterol, Calculated    mg/dl











Medical Emergencies








.


Who to Call and When:





Medical Emergencies:  If at any time you feel your situation is an emergency, 

please call 911 immediately.





.





Non-Emergent Contact


Non-Emergency issues call your:  Primary Care Provider, Cardiologist


Call Non-Emergent contact if:  temperature is above 101, you have any 

medication questions





.


.








"Provider Documentation" section prepared by Leda Concepcion.








.

## 2018-08-01 NOTE — DISCHARGE INSTRUCTIONS
Discharge Instructions


Date of Service


Aug 1, 2018.





Admission


Reason for Admission: Passing out





Discharge


Discharge Diagnosis / Problem:  Loop recorder implantation





Discharge Goals


Goal(s):  Diagnostic testing





Activity Recommendations


Activity Limitations:  per Instructions/Follow-up section





.





Instructions / Follow-Up


Instructions / Follow-Up


ACTIVITY RECOMMENDATIONS:





* Do not raise affected arm over head for 2 weeks.








SPECIAL CARE INSTRUCTIONS:





*  If bleeding occurs, apply direct pressure to area for 5 minutes.





*  Call your doctor if you have severe pain, fever, drainage or bleeding at 

site.





*  Keep dressing on and dry.





*  Keep any scheduled doctor's appointment.





*  Implant Card - hand held device with website information given.





SKIN IRRITATION:





*  You may experience some redness and/or swelling in the area where radiation 

was


   administered.  If any skin irritation occurs, please contact your family 

physician.








FOLLOW UP VISIT:





Keep any scheduled doctor appointments.





Current Hospital Diet


Patient's current hospital diet: AHA Diet (Heart Healthy), Diabetes Type 2 Diet





Discharge Diet


Recommended Diet:  N/A





Procedures


Procedures Performed:  


Stress echocardiogram


Chest x-ray


Loop recorder implantation





Pending Studies


Studies pending at discharge:  no





Laboratory Results





Hemoglobin A1c








Test


  6/28/18


08:40 Range/Units


 


 


Estimated Average Glucose 123   mg/dl


 


Hemoglobin A1c 5.9 H 4.5-5.6  %








Lipid Panel








Test


  6/28/18


08:40 Range/Units


 


 


Triglycerides Level 161 H 0-150  mg/dl


 


Cholesterol Level 117  0-200  mg/dl


 


HDL Cholesterol 35   mg/dl


 


LDL Cholesterol Direct 68   mg/dl


 


Cholesterol/HDL Ratio 3.3   


 


LDL Cholesterol, Calculated    mg/dl











Medical Emergencies








.


Who to Call and When:





Medical Emergencies:  If at any time you feel your situation is an emergency, 

please call 911 immediately.





.





Non-Emergent Contact


Non-Emergency issues call your:  Primary Care Provider





.


.








"Provider Documentation" section prepared by Suleman Cedeño.








.

## 2018-08-01 NOTE — [facility] OPERATIVE REPORT
Operative Report


Operative Date


Aug 1, 2018.





Pre-Operative Diagnosis





Syncope





Post-Operative Diagnosis


Same





Procedure(s) Performed


Loop recorder implantation





Surgeon


Dr. Cedeño





Assistant Surgeon(s)


None





Estimated Blood Loss


2 cc





Findings


Good loop recorder position





Specimens





None





Anesthesia


Local, no sedation





Complication(s)


None





Disposition


PCU





Description of Procedure


After obtaining informed consent for the procedure, the patient was brought to 

the laboratory having had nothing by mouth after midnight. The patient was 

prepped and draped in the standard sterile manner for a loop recorder 

implantation.





An area at the fourth left intercostal space and 1 cm left of the left sternal 

border was infiltrated with 1% lidocaine local anesthetic and a 0.5 cm incision 

was made through the skin. Using the loop recorder insertion tool the loop 

recorder was inserted through the incision at a 45 downward and leftward 

angle. The incision was closed with a subcutaneous continuous closure of 4-0 

Vicryl followed by a running subcuticular skin closure of 4-0 Vicryl. Steri-

Strips were applied and bacitracin ointment was placed on the incision. A 

dressing was applied.


I attest to the content of the Intraoperative Record and any orders documented 

therein.  Any exceptions are noted below.